# Patient Record
Sex: FEMALE | Race: WHITE | Employment: OTHER | URBAN - METROPOLITAN AREA
[De-identification: names, ages, dates, MRNs, and addresses within clinical notes are randomized per-mention and may not be internally consistent; named-entity substitution may affect disease eponyms.]

---

## 2018-04-20 ENCOUNTER — HOSPITAL ENCOUNTER (OUTPATIENT)
Age: 68
Setting detail: OBSERVATION
Discharge: HOME | End: 2018-04-21
Attending: EMERGENCY MEDICINE | Admitting: FAMILY MEDICINE
Payer: MEDICARE

## 2018-04-20 ENCOUNTER — APPOINTMENT (OUTPATIENT)
Dept: GENERAL RADIOLOGY | Age: 68
End: 2018-04-20
Attending: PHYSICIAN ASSISTANT
Payer: MEDICARE

## 2018-04-20 DIAGNOSIS — R00.0 SINUS TACHYCARDIA: ICD-10-CM

## 2018-04-20 DIAGNOSIS — I47.1 SVT (SUPRAVENTRICULAR TACHYCARDIA) (HCC): Primary | ICD-10-CM

## 2018-04-20 DIAGNOSIS — E03.9 HYPOTHYROIDISM, UNSPECIFIED TYPE: ICD-10-CM

## 2018-04-20 LAB
ALBUMIN SERPL-MCNC: 4 G/DL (ref 3.5–5)
ALBUMIN/GLOB SERPL: 1 {RATIO} (ref 1.1–2.2)
ALP SERPL-CCNC: 67 U/L (ref 45–117)
ALT SERPL-CCNC: 23 U/L (ref 12–78)
ANION GAP SERPL CALC-SCNC: 11 MMOL/L (ref 5–15)
AST SERPL-CCNC: 24 U/L (ref 15–37)
BASOPHILS # BLD: 0.1 K/UL (ref 0–0.1)
BASOPHILS NFR BLD: 1 % (ref 0–1)
BILIRUB SERPL-MCNC: 0.3 MG/DL (ref 0.2–1)
BUN SERPL-MCNC: 17 MG/DL (ref 6–20)
BUN/CREAT SERPL: 24 (ref 12–20)
CALCIUM SERPL-MCNC: 8.6 MG/DL (ref 8.5–10.1)
CHLORIDE SERPL-SCNC: 98 MMOL/L (ref 97–108)
CK SERPL-CCNC: 53 U/L (ref 26–192)
CK SERPL-CCNC: 74 U/L (ref 26–192)
CO2 SERPL-SCNC: 24 MMOL/L (ref 21–32)
CREAT SERPL-MCNC: 0.72 MG/DL (ref 0.55–1.02)
DIFFERENTIAL METHOD BLD: NORMAL
EOSINOPHIL # BLD: 0 K/UL (ref 0–0.4)
EOSINOPHIL NFR BLD: 0 % (ref 0–7)
ERYTHROCYTE [DISTWIDTH] IN BLOOD BY AUTOMATED COUNT: 13.1 % (ref 11.5–14.5)
GLOBULIN SER CALC-MCNC: 4.1 G/DL (ref 2–4)
GLUCOSE SERPL-MCNC: 151 MG/DL (ref 65–100)
HCT VFR BLD AUTO: 44.5 % (ref 35–47)
HGB BLD-MCNC: 14.8 G/DL (ref 11.5–16)
IMM GRANULOCYTES # BLD: 0 K/UL (ref 0–0.04)
IMM GRANULOCYTES NFR BLD AUTO: 0 % (ref 0–0.5)
LYMPHOCYTES # BLD: 1.7 K/UL (ref 0.8–3.5)
LYMPHOCYTES NFR BLD: 16 % (ref 12–49)
MAGNESIUM SERPL-MCNC: 1.9 MG/DL (ref 1.6–2.4)
MCH RBC QN AUTO: 31.1 PG (ref 26–34)
MCHC RBC AUTO-ENTMCNC: 33.3 G/DL (ref 30–36.5)
MCV RBC AUTO: 93.5 FL (ref 80–99)
MONOCYTES # BLD: 0.8 K/UL (ref 0–1)
MONOCYTES NFR BLD: 8 % (ref 5–13)
NEUTS SEG # BLD: 7.8 K/UL (ref 1.8–8)
NEUTS SEG NFR BLD: 75 % (ref 32–75)
NRBC # BLD: 0 K/UL (ref 0–0.01)
NRBC BLD-RTO: 0 PER 100 WBC
PLATELET # BLD AUTO: 244 K/UL (ref 150–400)
PMV BLD AUTO: 10.5 FL (ref 8.9–12.9)
POTASSIUM SERPL-SCNC: 4.2 MMOL/L (ref 3.5–5.1)
PROT SERPL-MCNC: 8.1 G/DL (ref 6.4–8.2)
RBC # BLD AUTO: 4.76 M/UL (ref 3.8–5.2)
SODIUM SERPL-SCNC: 133 MMOL/L (ref 136–145)
T4 FREE SERPL-MCNC: 1.8 NG/DL (ref 0.8–1.5)
TROPONIN I SERPL-MCNC: 0.06 NG/ML
TROPONIN I SERPL-MCNC: <0.04 NG/ML
TROPONIN I SERPL-MCNC: <0.04 NG/ML
TSH SERPL DL<=0.05 MIU/L-ACNC: 1.25 UIU/ML (ref 0.36–3.74)
WBC # BLD AUTO: 10.4 K/UL (ref 3.6–11)

## 2018-04-20 PROCEDURE — 74011250636 HC RX REV CODE- 250/636

## 2018-04-20 PROCEDURE — 74011250636 HC RX REV CODE- 250/636: Performed by: EMERGENCY MEDICINE

## 2018-04-20 PROCEDURE — 85025 COMPLETE CBC W/AUTO DIFF WBC: CPT | Performed by: PHYSICIAN ASSISTANT

## 2018-04-20 PROCEDURE — 74011250637 HC RX REV CODE- 250/637: Performed by: EMERGENCY MEDICINE

## 2018-04-20 PROCEDURE — 96361 HYDRATE IV INFUSION ADD-ON: CPT

## 2018-04-20 PROCEDURE — 96376 TX/PRO/DX INJ SAME DRUG ADON: CPT

## 2018-04-20 PROCEDURE — 84484 ASSAY OF TROPONIN QUANT: CPT | Performed by: PHYSICIAN ASSISTANT

## 2018-04-20 PROCEDURE — 84443 ASSAY THYROID STIM HORMONE: CPT | Performed by: EMERGENCY MEDICINE

## 2018-04-20 PROCEDURE — 96375 TX/PRO/DX INJ NEW DRUG ADDON: CPT

## 2018-04-20 PROCEDURE — 84480 ASSAY TRIIODOTHYRONINE (T3): CPT | Performed by: EMERGENCY MEDICINE

## 2018-04-20 PROCEDURE — 93005 ELECTROCARDIOGRAM TRACING: CPT

## 2018-04-20 PROCEDURE — 74011250636 HC RX REV CODE- 250/636: Performed by: FAMILY MEDICINE

## 2018-04-20 PROCEDURE — 96374 THER/PROPH/DIAG INJ IV PUSH: CPT

## 2018-04-20 PROCEDURE — 82550 ASSAY OF CK (CPK): CPT | Performed by: EMERGENCY MEDICINE

## 2018-04-20 PROCEDURE — 93306 TTE W/DOPPLER COMPLETE: CPT

## 2018-04-20 PROCEDURE — 71045 X-RAY EXAM CHEST 1 VIEW: CPT

## 2018-04-20 PROCEDURE — 74011000250 HC RX REV CODE- 250: Performed by: FAMILY MEDICINE

## 2018-04-20 PROCEDURE — 77030018729 HC ELECTRD DEFIB PAD CARD -B

## 2018-04-20 PROCEDURE — 96365 THER/PROPH/DIAG IV INF INIT: CPT

## 2018-04-20 PROCEDURE — 99285 EMERGENCY DEPT VISIT HI MDM: CPT

## 2018-04-20 PROCEDURE — 93041 RHYTHM ECG TRACING: CPT

## 2018-04-20 PROCEDURE — 74011000250 HC RX REV CODE- 250: Performed by: EMERGENCY MEDICINE

## 2018-04-20 PROCEDURE — 96366 THER/PROPH/DIAG IV INF ADDON: CPT

## 2018-04-20 PROCEDURE — 84439 ASSAY OF FREE THYROXINE: CPT | Performed by: EMERGENCY MEDICINE

## 2018-04-20 PROCEDURE — 94762 N-INVAS EAR/PLS OXIMTRY CONT: CPT

## 2018-04-20 PROCEDURE — 80053 COMPREHEN METABOLIC PANEL: CPT | Performed by: PHYSICIAN ASSISTANT

## 2018-04-20 PROCEDURE — 83735 ASSAY OF MAGNESIUM: CPT | Performed by: INTERNAL MEDICINE

## 2018-04-20 PROCEDURE — 74011000258 HC RX REV CODE- 258: Performed by: FAMILY MEDICINE

## 2018-04-20 PROCEDURE — 36415 COLL VENOUS BLD VENIPUNCTURE: CPT | Performed by: EMERGENCY MEDICINE

## 2018-04-20 PROCEDURE — 99218 HC RM OBSERVATION: CPT

## 2018-04-20 PROCEDURE — 86618 LYME DISEASE ANTIBODY: CPT | Performed by: EMERGENCY MEDICINE

## 2018-04-20 PROCEDURE — 65660000001 HC RM ICU INTERMED STEPDOWN

## 2018-04-20 PROCEDURE — 74011250637 HC RX REV CODE- 250/637: Performed by: INTERNAL MEDICINE

## 2018-04-20 RX ORDER — ADENOSINE 3 MG/ML
6 INJECTION, SOLUTION INTRAVENOUS
Status: COMPLETED | OUTPATIENT
Start: 2018-04-20 | End: 2018-04-20

## 2018-04-20 RX ORDER — METOPROLOL TARTRATE 5 MG/5ML
5 INJECTION INTRAVENOUS
Status: COMPLETED | OUTPATIENT
Start: 2018-04-20 | End: 2018-04-20

## 2018-04-20 RX ORDER — ADENOSINE 3 MG/ML
12 INJECTION, SOLUTION INTRAVENOUS
Status: COMPLETED | OUTPATIENT
Start: 2018-04-20 | End: 2018-04-20

## 2018-04-20 RX ORDER — SODIUM CHLORIDE 0.9 % (FLUSH) 0.9 %
5-10 SYRINGE (ML) INJECTION AS NEEDED
Status: DISCONTINUED | OUTPATIENT
Start: 2018-04-20 | End: 2018-04-21 | Stop reason: HOSPADM

## 2018-04-20 RX ORDER — ACETAMINOPHEN 325 MG/1
650 TABLET ORAL
Status: DISCONTINUED | OUTPATIENT
Start: 2018-04-20 | End: 2018-04-21 | Stop reason: HOSPADM

## 2018-04-20 RX ORDER — SODIUM CHLORIDE 0.9 % (FLUSH) 0.9 %
5-10 SYRINGE (ML) INJECTION EVERY 8 HOURS
Status: DISCONTINUED | OUTPATIENT
Start: 2018-04-20 | End: 2018-04-21 | Stop reason: HOSPADM

## 2018-04-20 RX ORDER — ADENOSINE 3 MG/ML
INJECTION, SOLUTION INTRAVENOUS
Status: COMPLETED
Start: 2018-04-20 | End: 2018-04-20

## 2018-04-20 RX ORDER — GUAIFENESIN 100 MG/5ML
81 LIQUID (ML) ORAL DAILY
Status: DISCONTINUED | OUTPATIENT
Start: 2018-04-21 | End: 2018-04-21 | Stop reason: HOSPADM

## 2018-04-20 RX ORDER — LIOTHYRONINE SODIUM 5 UG/1
5 TABLET ORAL DAILY
COMMUNITY
End: 2018-04-20

## 2018-04-20 RX ORDER — SODIUM CHLORIDE 9 MG/ML
75 INJECTION, SOLUTION INTRAVENOUS CONTINUOUS
Status: DISCONTINUED | OUTPATIENT
Start: 2018-04-20 | End: 2018-04-21 | Stop reason: HOSPADM

## 2018-04-20 RX ORDER — METOPROLOL TARTRATE 25 MG/1
25 TABLET, FILM COATED ORAL EVERY 8 HOURS
Status: DISCONTINUED | OUTPATIENT
Start: 2018-04-20 | End: 2018-04-21 | Stop reason: HOSPADM

## 2018-04-20 RX ORDER — LEVOTHYROXINE SODIUM 112 UG/1
112 TABLET ORAL
Status: DISCONTINUED | OUTPATIENT
Start: 2018-04-21 | End: 2018-04-21 | Stop reason: HOSPADM

## 2018-04-20 RX ORDER — METOPROLOL TARTRATE 25 MG/1
25 TABLET, FILM COATED ORAL 2 TIMES DAILY
Status: DISCONTINUED | OUTPATIENT
Start: 2018-04-20 | End: 2018-04-20

## 2018-04-20 RX ORDER — LEVOTHYROXINE SODIUM 112 UG/1
112 TABLET ORAL
COMMUNITY

## 2018-04-20 RX ADMIN — DILTIAZEM HYDROCHLORIDE 5 MG/HR: 5 INJECTION INTRAVENOUS at 16:54

## 2018-04-20 RX ADMIN — METOPROLOL TARTRATE 5 MG: 5 INJECTION, SOLUTION INTRAVENOUS at 14:05

## 2018-04-20 RX ADMIN — ADENOSINE 12 MG: 3 INJECTION, SOLUTION INTRAVENOUS at 14:02

## 2018-04-20 RX ADMIN — DILTIAZEM HYDROCHLORIDE 2.5 MG/HR: 5 INJECTION INTRAVENOUS at 18:31

## 2018-04-20 RX ADMIN — DILTIAZEM HYDROCHLORIDE 2.5 MG/HR: 5 INJECTION INTRAVENOUS at 16:15

## 2018-04-20 RX ADMIN — Medication 10 ML: at 21:45

## 2018-04-20 RX ADMIN — ADENOSINE 6 MG: 3 INJECTION, SOLUTION INTRAVENOUS at 13:32

## 2018-04-20 RX ADMIN — SODIUM CHLORIDE 1000 ML: 900 INJECTION, SOLUTION INTRAVENOUS at 13:22

## 2018-04-20 RX ADMIN — ADENOSINE 12 MG: 3 INJECTION, SOLUTION INTRAVENOUS at 13:36

## 2018-04-20 RX ADMIN — PROPRANOLOL HYDROCHLORIDE 60 MG: 20 TABLET ORAL at 17:33

## 2018-04-20 RX ADMIN — SODIUM CHLORIDE 1000 ML: 900 INJECTION, SOLUTION INTRAVENOUS at 15:53

## 2018-04-20 RX ADMIN — SODIUM CHLORIDE 75 ML/HR: 900 INJECTION, SOLUTION INTRAVENOUS at 16:57

## 2018-04-20 RX ADMIN — Medication 10 ML: at 15:11

## 2018-04-20 RX ADMIN — SODIUM CHLORIDE 1000 ML: 900 INJECTION, SOLUTION INTRAVENOUS at 14:08

## 2018-04-20 RX ADMIN — METOPROLOL TARTRATE 25 MG: 25 TABLET ORAL at 15:55

## 2018-04-20 RX ADMIN — METOPROLOL TARTRATE 5 MG: 5 INJECTION, SOLUTION INTRAVENOUS at 15:54

## 2018-04-20 NOTE — ED PROVIDER NOTES
HPI Comments: 69 y.o. female with past medical history significant for SVT, fibromyalgia, and hypothyroid who presents from PCP via private vehicle with chief complaint of heart palpitations. Pt states she has felt intermittent flutters and palpitations for a few days and overall not feeling well. Pt states she went to her PCP's office today and was found to have an elevated HR and BP, was given 2 pills \"that started with a C\" and was sent here for further evaluation, on arrival found to be in SVT. Pt reports a history of SVT, stating she had an ablation 15 years ago in [place]. Pt reports a history of hypothyroidism, and states 6 weeks ago her T3 was increased. Lab work from last week showed an increased Reverse T3 at 30.9, iodine at 103, 3T4 of 1.93. Pt states she recently tested positive for Lyme's Disease, found a tick on her 3 days ago, has not started treatment yet. Pt denies any chest pain, nausea, vomiting, diarrhea, fever, cough, or appetite change. There are no other acute medical concerns at this time. Social hx: Nonsmoker; No EtOH use    Note written by Tali Hughes, as dictated by Yin Perry, DO 1:20 PM          The history is provided by the patient. No  was used. Past Medical History:   Diagnosis Date    Fibromyalgia     Hypothyroid     SVT (supraventricular tachycardia) (HCC)        Past Surgical History:   Procedure Laterality Date    HX OTHER SURGICAL      removal of IUD    HX SVT ABLATION      HX TONSILLECTOMY           History reviewed. No pertinent family history. Social History     Social History    Marital status: N/A     Spouse name: N/A    Number of children: N/A    Years of education: N/A     Occupational History    Not on file.      Social History Main Topics    Smoking status: Never Smoker    Smokeless tobacco: Never Used    Alcohol use No    Drug use: Not on file    Sexual activity: Not on file     Other Topics Concern    Not on file     Social History Narrative    No narrative on file         ALLERGIES: Codeine and Sulfa (sulfonamide antibiotics)    Review of Systems   Constitutional: Negative for appetite change and fever. HENT: Negative for trouble swallowing. Eyes: Negative for visual disturbance. Respiratory: Negative for cough. Cardiovascular: Positive for palpitations. Negative for chest pain. Gastrointestinal: Negative for diarrhea, nausea and vomiting. Genitourinary: Negative for difficulty urinating. Musculoskeletal: Negative for gait problem. Skin: Negative for rash. Neurological: Negative for headaches. Hematological: Does not bruise/bleed easily. Psychiatric/Behavioral: Negative for sleep disturbance. All other systems reviewed and are negative. Vitals:    04/20/18 1252   BP: 173/82   Pulse: (!) 168   Resp: 18   Temp: 98.1 °F (36.7 °C)   SpO2: 96%   Weight: 61.3 kg (135 lb 2 oz)   Height: 5' 5\" (1.651 m)            Physical Exam   Constitutional: She is oriented to person, place, and time. She appears well-developed and well-nourished. HENT:   Head: Normocephalic and atraumatic. Eyes: Conjunctivae are normal.   Neck: Normal range of motion. Cardiovascular: Intact distal pulses. Tachycardia present. Tachycardic. Pulmonary/Chest: Effort normal and breath sounds normal. No respiratory distress. Abdominal: Soft. Bowel sounds are increased. There is no tenderness. There is no rebound and no guarding. Hyperactive bowel sounds. Musculoskeletal: Normal range of motion. She exhibits edema. Trace pretibial edema. Neurological: She is alert and oriented to person, place, and time. Skin: Skin is warm and dry. Psychiatric: Her behavior is normal.   Nursing note and vitals reviewed.      Note written by Tali Mathews, as dictated by Adwoa Chun DO 1:20 PM    MDM  Number of Diagnoses or Management Options  Hypothyroidism, unspecified type:   Sinus tachycardia:   SVT (supraventricular tachycardia) ([facility] [address]):      Amount and/or Complexity of Data Reviewed  Clinical lab tests: ordered and reviewed  Tests in the radiology section of CPT®: ordered and reviewed  Tests in the medicine section of CPT®: ordered and reviewed  Obtain history from someone other than the patient: yes  Review and summarize past medical records: yes  Discuss the patient with other providers: yes  Independent visualization of images, tracings, or specimens: yes    Risk of Complications, Morbidity, and/or Mortality  Presenting problems: high  Diagnostic procedures: low  Management options: high    Critical Care  Total time providing critical care:  minutes (Total critical care time spent exclusive of procedures:  75 minutes.   )    Patient Progress  Patient progress: improved        ED Course       Procedures    ED EKG interpretation: 13:00 #1  Rhythm: Supraventricular tachycardia with singular PVC vs fusion complex; and regular . Rate (approx.): 165 bpm; Axis: normal; Long QTc at 487 ms; Not a STEMI. Note written by Tali Gallagher, as dictated by Lilia Smith DO 1:06 PM    PROGRESS NOTE:  1:21 PM  Performed Modified Valsalva, no success. PROGRESS NOTE:  1:27 PM  Retried modified valsalva, no success. PROGRESS NOTE:  1:32 PM  6 mg Adenocard given, no success. PROGRESS NOTE:  1:34 PM  12 mg Adenocard given, success conversation to a normal sinus rhythm. ED EKG interpretation: 13:35 #2  Rhythm: sinus tachycardia; and regular . Rate (approx.): 122 bpm; Axis: normal; Normal intervals; No ST changes. Note written by Tali Gallagher, as dictated by Lilia Smith DO 1:45 PM      PROGRESS NOTE:  1:54 PM  Pt's HR is now 160. Ordering another 12 mg of adenocard. PROGRESS NOTE:  2:02 PM  12 mg Adenocard given. Success for 60 seconds. PROGRESS NOTE:  2:05 PM  5 mg of IV metoprolol given. HR down to 110s.      PROGRESS NOTE:  2:13 PM  HR now creeping up to high 120s, 130.     CONSULT NOTE:  2:15 PM Jaspreet Goodman DO spoke with Dr. Delvis Alexander, Consult for Hospitalist.  Discussed available diagnostic tests and clinical findings. Dr. Delvis Alexander will see and admit the pt. PROGRESS NOTE:  2:24 PM  HR is now low 130s. More metoprolol IV ordered, as well as propranolol PO    PROGRESS NOTE:  2:36 PM  Cardiology PA is in ED to see the pt.

## 2018-04-20 NOTE — ED TRIAGE NOTES
Pt was sent here by her MD for heart palpitations, pt states that she has them on and off a lot. Pt denies chest pain, SOB, and NVD. Pt states that her MD gave her a medication that \"started with a C\" in her office.

## 2018-04-20 NOTE — PROGRESS NOTES
Admission Medication Reconciliation:    Information obtained from: Patient, Rx Query, and [employer]    Significant PMH/Disease States:   Past Medical History:   Diagnosis Date    Fibromyalgia     Hypothyroid     SVT (supraventricular tachycardia) ([facility] [address]        Chief Complaint for this Admission:  No chief complaint on file. Allergies:  Codeine; Erythromycin; and Sulfa (sulfonamide antibiotics)    Prior to Admission Medications:   Prior to Admission Medications   Prescriptions Last Dose Informant Patient Reported? Taking? OTHER   Yes Yes   Sig: Variety of supplements - names unknown. OTHER,NON-FORMULARY, 4/20/2018 at Unknown time  Yes Yes   Sig: Take 5 mcg by mouth daily. Compounded SR T3 (replacement for Cytomel) from [employer]   levothyroxine (LEVOXYL) 112 mcg tablet 4/20/2018 at Unknown time  Yes Yes   Sig: Take 112 mcg by mouth Daily (before breakfast). Facility-Administered Medications: None         Comments/Recommendations:     Spoke with patient regarding allergies and PTA medications. [address] for clarification on compounded T3.    1) Reviewed and confirmed allergies. Added erythromycin - patient experiences overall \"weird feeling\" and nausea. 2) Updated PTA medication list. Added levoxyl, compounded T3, and \"other\" listing for variety of supplements. The patient reports she takes a variety of OTC supplements, however she is unsure the names of them at this time. Rx Query also shows fills for ivermectin 3 mg filled 4/19/18 for #16 and 40 ds, minocycline 100 mg filled 4/10/18 for #30 and 15 ds, and tinadazole 250 mg filled 4/19/18 for #30 and 60 ds. Patient reports she has not started taking any of these medications. Last dose information listed in table above.       Tabatha Roberts, MikeD

## 2018-04-20 NOTE — IP AVS SNAPSHOT
[address] [facility] [address] 
[phone] Patient: Saige Werner MRN: [id number] :1950 A check maria dolores indicates which time of day the medication should be taken. My Medications START taking these medications Instructions Each Dose to Equal  
 Morning Noon Evening Bedtime  
 dilTIAZem  mg ER capsule Commonly known as:  CARDIZEM CD Your last dose was: Your next dose is: Take 1 Cap by mouth daily. 120 mg  
    
   
   
   
  
 magnesium oxide 400 mg tablet Commonly known as:  MAG-OX Start taking on:  2018 Your last dose was: Your next dose is: Take 1 Tab by mouth daily. 400 mg  
    
   
   
   
  
 nebivolol 5 mg tablet Commonly known as:  BYSTOLIC Your last dose was: Your next dose is: Take 1 Tab by mouth daily. 5 mg CONTINUE taking these medications Instructions Each Dose to Equal  
 Morning Noon Evening Bedtime LEVOXYL 112 mcg tablet Generic drug:  levothyroxine Your last dose was: Your next dose is: Take 112 mcg by mouth Daily (before breakfast). 112 mcg STOP taking these medications OTHER  
   
  
 OTHER(NON-FORMULARY) Where to Get Your Medications Information on where to get these meds will be given to you by the nurse or doctor. ! Ask your nurse or doctor about these medications  
  dilTIAZem  mg ER capsule  
 magnesium oxide 400 mg tablet  
 nebivolol 5 mg tablet

## 2018-04-20 NOTE — IP AVS SNAPSHOT
[address] [facility] [address] [place] [address] 
[phone] Patient: Quang Rowan MRN: [id number] :1950 About your hospitalization You were admitted on:  2018 You last received care in the:  [facility] [address] You were discharged on:  2018 Why you were hospitalized Your primary diagnosis was:  Svt (Supraventricular Tachycardia) ([facility]) Follow-up Information Follow up With Details Comments Contact Info Ariela Cedeño MD Schedule an appointment as soon as possible for a visit in 1 week  [facility] [place] [address] [facility] 
[phone] Gt Le MD In 1 week  [place] [address] [address] [place] [address] 
[phone] Discharge Orders None A check maria doolres indicates which time of day the medication should be taken. My Medications START taking these medications Instructions Each Dose to Equal  
 Morning Noon Evening Bedtime  
 dilTIAZem  mg ER capsule Commonly known as:  CARDIZEM CD Your last dose was: Your next dose is: Take 1 Cap by mouth daily. 120 mg  
    
   
   
   
  
 magnesium oxide 400 mg tablet Commonly known as:  MAG-OX Start taking on:  2018 Your last dose was: Your next dose is: Take 1 Tab by mouth daily. 400 mg  
    
   
   
   
  
 nebivolol 5 mg tablet Commonly known as:  BYSTOLIC Your last dose was: Your next dose is: Take 1 Tab by mouth daily. 5 mg CONTINUE taking these medications Instructions Each Dose to Equal  
 Morning Noon Evening Bedtime LEVOXYL 112 mcg tablet Generic drug:  levothyroxine Your last dose was: Your next dose is: Take 112 mcg by mouth Daily (before breakfast). 112 mcg STOP taking these medications  OTHER  
 OTHER(NON-FORMULARY) Where to Get Your Medications Information on where to get these meds will be given to you by the nurse or doctor. ! Ask your nurse or doctor about these medications  
  dilTIAZem  mg ER capsule  
 magnesium oxide 400 mg tablet  
 nebivolol 5 mg tablet Discharge Instructions Discharge Instructions PATIENT ID: Kacey Kay MRN: [id number] YOB: 1950 DATE OF ADMISSION: 4/20/2018  1:02 PM   
DATE OF DISCHARGE: 4/21/2018 PRIMARY CARE PROVIDER: Kennedy Schaffer MD  
 
ATTENDING PHYSICIAN: Marie Recinos MD 
DISCHARGING PROVIDER: Aimee Sherman MD   
To contact this individual call [phone] and ask the  to page. If unavailable ask to be transferred the Adult Hospitalist Department. DISCHARGE DIAGNOSES 1. SVT 
 
CONSULTATIONS: IP CONSULT TO CARDIOLOGY PROCEDURES/SURGERIES: * No surgery found * PENDING TEST RESULTS:  
At the time of discharge the following test results are still pending: None FOLLOW UP APPOINTMENTS:  
Follow-up Information Follow up With Details Comments Contact Info Kennedy Schaffer MD Schedule an appointment as soon as possible for a visit in 1 week  [facility] [address] [place] [address] 
[phone] ADDITIONAL CARE RECOMMENDATIONS:  
 
1. Follow up with PCP 2. Needs TSH, T4 at your visit. DIET: Cardiac diet ACTIVITY: Activity as tolerated DISCHARGE MEDICATIONS: 
 See Medication Reconciliation Form · It is important that you take the medication exactly as they are prescribed. · Keep your medication in the bottles provided by the pharmacist and keep a list of the medication names, dosages, and times to be taken in your wallet. · Do not take other medications without consulting your doctor. NOTIFY YOUR PHYSICIAN FOR ANY OF THE FOLLOWING:  
Fever over 101 degrees for 24 hours. Chest pain, shortness of breath, fever, chills, nausea, vomiting, diarrhea, change in mentation, falling, weakness, bleeding. Severe pain or pain not relieved by medications. Or, any other signs or symptoms that you may have questions about. DISPOSITION: 
x  Home With: 
 OT  PT  [place]  RN  
  
 SNF/Inpatient Rehab/LTAC Independent/assisted living Hospice Other: CDMP Checked:  
Yes x PROBLEM LIST Updated: 
Yes x Signed:  
Toni Noyola MD 
4/21/2018 
1:22 PM 
 
  
  
  
Introducing [facility] SERVICES! [employer] introduces [employer] patient portal. Now you can access parts of your medical record, email your doctor's office, and request medication refills online. 1. In your internet browser, go to [email or web address] 2. Click on the First Time User? Click Here link in the Sign In box. You will see the New Member Sign Up page. 3. Enter your [employer] Access Code exactly as it appears below. You will not need to use this code after youve completed the sign-up process. If you do not sign up before the expiration date, you must request a new code. · [employer] Access Code: [email or web address] Expires: 7/19/2018 12:48 PM 
 
4. Enter the last four digits of your Social Security Number (xxxx) and Date of Birth (mm/dd/yyyy) as indicated and click Submit. You will be taken to the next sign-up page. 5. Create a [employer] ID. This will be your [employer] login ID and cannot be changed, so think of one that is secure and easy to remember. 6. Create a [employer] password. You can change your password at any time. 7. Enter your Password Reset Question and Answer. This can be used at a later time if you forget your password. 8. Enter your e-mail address. You will receive e-mail notification when new information is available in [address] 9. Click Sign Up. You can now view and download portions of your medical record.  
10. Click the Download Summary menu link to download a portable copy of your medical information. If you have questions, please visit the Frequently Asked Questions section of the [employer] website. Remember, [employer] is NOT to be used for urgent needs. For medical emergencies, dial 911. Now available from your iPhone and Android! Introducing Vinicio Joyner As a [facility] patient, I wanted to make you aware of our electronic visit tool called Vinicio Joyner. [facility] 24/7 allows you to connect within minutes with a medical provider 24 hours a day, seven days a week via a mobile device or tablet or logging into a secure website from your computer. You can access Vinicio Joyner from anywhere in the [place]. A virtual visit might be right for you when you have a simple condition and feel like you just dont want to get out of bed, or cant get away from work for an appointment, when your regular [facility] provider is not available (evenings, weekends or holidays), or when youre out of town and need minor care. Electronic visits cost only $49 and if the [facility] 24/7 provider determines a prescription is needed to treat your condition, one can be electronically transmitted to a nearby pharmacy*. Please take a moment to enroll today if you have not already done so. The enrollment process is free and takes just a few minutes. To enroll, please download the [facility] [employer] tahis to your tablet or phone, or visit [email or web address] to enroll on your computer. And, as an [address] [place] [address] patient with a [employer] account, the results of your visits will be scanned into your electronic medical record and your primary care provider will be able to view the scanned results. We urge you to continue to see your regular [facility] provider for your ongoing medical care.   And while your primary care provider may not be the one available when you seek a Vinicio Joyner virtual visit, the peace of mind you get from getting a real diagnosis real time can be priceless. For more information on Vinicio Juvenalmaren, view our Frequently Asked Questions (FAQs) at [email or web address]. Sincerely, 
 
Amie Laura MD 
Chief Medical Officer [address] *:  certain medications cannot be prescribed via Vinicio Joyner Unresulted Labs-Please follow up with your PCP about these lab tests Order Current Status LYME AB TOTAL W/RFLX W BLOT In process T3 TOTAL In process Providers Seen During Your Hospitalization Provider Specialty Primary office phone Cristine Montalvo, [address] [facility] Emergency Medicine [phone] Flavia Maharaj MD Hospitalist [phone] Your Primary Care Physician (PCP) Primary Care Physician Office Phone Office Fax 34269 Edmund Zamora, [address] [facility] [phone] You are allergic to the following Allergen Reactions Codeine Other (comments) Hallucinations Erythromycin Nausea Only Other (comments) Patient describes overall \"weird feeling\" when taking medication Sulfa (Sulfonamide Antibiotics) Unknown (comments) Recent Documentation Height Weight BMI OB Status Smoking Status 1.651 m 61.3 kg 22.49 kg/m2 Postmenopausal Never Smoker Emergency Contacts Name Discharge Info Relation Home Work Mobile Champ Bhagat DISCHARGE CAREGIVER [3] Spouse [3] [phone] Aria Mills DISCHARGE CAREGIVER [3] Daughter [phone] Patient Belongings The following personal items are in your possession at time of discharge: 
  Dental Appliances: None  Visual Aid: None      Home Medications: None   Jewelry: None  Clothing: None    Other Valuables: Cell Phone Please provide this summary of care documentation to your next provider.  
  
  
 
  
Signatures-by signing, you are acknowledging that this After Visit Summary has been reviewed with you and you have received a copy. Patient Signature:  ____________________________________________________________ Date:  ____________________________________________________________  
  
Romero Livings Provider Signature:  ____________________________________________________________ Date:  ____________________________________________________________

## 2018-04-20 NOTE — H&P
[address]   HISTORY AND PHYSICAL      He Arellano  MR#: [id number]  : 1950  ACCOUNT #: [de-identified]   ADMIT DATE: 2018    CHIEF COMPLAINT:  Palpitations. HISTORY OF PRESENT ILLNESS:  Patient is a 78-year-old female with past medical history of supraventricular tachycardia, fibromyalgia, hypothyroidism and recently diagnosed  who presents to the hospital.  Patient reports that about three days back, she started having some intermittent flutters and palpitations and having generalized weakness. The patient had a scheduled appointment with the PCP today, felt nervous while going to the PCP and also had some fluttering in her chest.  Patient was found to have elevated heart rate and elevated blood pressure was given 2 unknown pills by the PCP and was sent to the ER for further management and evaluation. Patient reports that she has a history of SVT and was diagnosed with SVT and was ablated for SVT. about 15 years ago in [place]. The patient reports that she has a history of hypothyroidism. About 6 weeks ago her T3 was increased and there was some increased reverse T3 that was noticed. The patient's medications were titrated based on the same. The patient also reports that about two weeks back she was diagnosed with Lyme disease. The patient reports that per her PCP she was told that the Lyme disease has been going on for at least a year or so. The patient reports that she has developed some tremors, but is not sure whether this is because of the Lyme disease. The patient denies any rashes or any joint pains associated with her symptoms. The patient reports that she found a live tick on her finger about 3 days ago. Patient reports that she was prescribed treatment for Lyme, but she has not started the treatment as of now. Patient denies any other complaints or problems, denies any headache, blurry vision, sore throat,  problems with speech, any chest pain. Does admit to mild shortness of breath. Denies any fever, chills, hematemesis. No melena, hemoptysis, nausea, vomiting, abdominal pain, constipation, diarrhea, urinary symptoms, focal or generalized neurological weakness, recent travel, sick contacts, or any other concerns or problems. The patient was found to have a heart rate of around 168 on arrival to the ER and a blood pressure 163/98. Currently, the patient's heart rate is in the 130s. Patient denies any other complaints or problems. PAST MEDICAL HISTORY:  See above. HOME MEDICATIONS:  Currently the patient is on levothyroxine 112 mcg daily, compounded T3 5 mcg daily. SOCIAL HISTORY:  Denies tobacco use, alcohol use, IV drug abuse. Lives at home. ALLERGIES:  CODEINE, ERYTHROMYCIN, SULFA ANTIBIOTICS. FAMILY HISTORY:  Was discussed, was found to be noncontributory. REVIEW OF SYSTEMS:  All systems were reviewed and were found to be essentially negative except for those symptoms mentioned above. PHYSICAL EXAMINATION:  VITAL SIGNS:  Temperature 98.1, pulse 137, respiratory rate 18, blood pressure 148/72, pulse oximetry 96% on room air. GENERAL:  Alert x3 awake, no acute distress, resting in bed, pleasant female, appears to be stated age. HEENT:  Pupils equal, reactive to light. Dry mucous membranes. Tympanic membranes clear. NECK:  Supple. CHEST:  Clear to auscultation bilaterally. HEART:  Tachycardic. ABDOMEN:  Soft, nontender, nondistended. Bowel sounds are physiological.  EXTREMITIES:  No clubbing, no cyanosis, no edema. NEUROPSYCHIATRIC:  Pleasant mood and affect. Cranial nerves II-XII grossly intact. Sensory grossly within normal limits. DTRs 2+/4. Strength 5/5. SKIN:  Warm. LABORATORY DATA:  White count 10.4, hemoglobin 14.8, hematocrit 44.5, platelets 613,574.   Sodium 133, potassium 4.2, chloride 98, bicarbonate 24, anion gap 11, glucose 151, BUN 17, creatinine 0.72, calcium 8.6,  , bilirubin total 0.3, ALT 23, AST 24, alkaline phosphatase 67. Troponin less than 0.04. TSH 1.65. IMAGING: X-ray of the chest shows no acute abnormalities. EKG shows sinus tachycardia with PVCs. ASSESSMENT AND PLAN:    1. Sustained Narrow complex tachycardia, appears to be supraventricular tachycardia. I spoke with Dr. Robert Go from cardiology who recommended starting the patient on diltiazem continuous infusion. The patient already received 30 of adenosine in the ER along with some metoprolol. We will also add oral beta blockers to control her heart rate. We will get an echocardiogram, troponin, TSH, T3, and T4 levels. We will provide gentle IV hydration and close monitoring. May consider cardioversion. Defer to cardiology. Further intervention will be per hospital course. Reassess as needed. Continue to closely monitor on telemetry bed. We will also get an echocardiogram.  2.  Recent diagnosis of Lyme disease. I spoke with Dr. Abi Martines from ID. He recommends getting Lyme antibody testing done, holding antibiotics for now and monitoring. She will consult if and when Lyme test results or if patient becomes symptomatic. We will continue to monitor IV fluids. Further intervention will be per hospital course. We will reassess as needed.    have been ordered. 3.  History of hypothyroidism. Continue home medication, T3, T4 have been ordered. We will obtain records from primary care physician to decide her recent changes in medication. 4.  History of cystitis. The patient reports that she has been having some dysuria. We will check a UA and monitor. 5. Gastrointestinal and deep vein thrombosis prophylaxis. The patient will be on sequential compression devices.       Lyndesy Cooper MD MM/PREMA  D: 04/20/2018 15:11     T: 04/20/2018 16:07  JOB #: [id number]

## 2018-04-20 NOTE — ED NOTES
Pt . Dr. Gilma Carias at bedside. Adeoncard 12mg ordered and administered. IVF infusing. Pt A&Ox4. O2 99% on RA. Pt HR remains 160's.  New verbal order received

## 2018-04-20 NOTE — CONSULTS
Cardiology Consult    Patient: Serenity Church MRN: [id number]  SSN: [id number]    YOB: 1950  Age: 69 y.o. Sex: female       Subjective:      Date of  Admission: 4/20/2018     Admission type: Emergency    Serenity Church is a 69 y.o. female admitted for SVT (supraventricular tachycardia) ([facility] Reason: SVT  Referring: Dr. Nova Curiel  Pt with h/o SVT s/p ablation in [place] 15 yrs ago, fibromyalgia, hypothyroid admit with SVT. She went to see her PCP today to check her thyroid and was 'very nervous'. Found there to be in SVT and sent to the ER. She felt palpitations similar to SVT in the past.  Prior to ablation, she had been on bystolic and verapamil. She came off those meds many years ago. She had palpitations infrequently since then. Denies any shortness of breath or exertional chest pain. Has baseline chest pain from fibromyalgia. Was given adenosine x 1 and went to sinus rhythm, later repeated for recurrent SVT. HR is in 130s in sinus vs atrial tachycardia. Primary Care Provider: Ruben Dixon MD  Past Medical History:   Diagnosis Date    Fibromyalgia     Hypothyroid     SVT (supraventricular tachycardia) ([facility]       Past Surgical History:   Procedure Laterality Date    HX OTHER SURGICAL      removal of IUD    HX SVT ABLATION      HX TONSILLECTOMY       History reviewed. No pertinent family history. Social History   Substance Use Topics    Smoking status: Never Smoker    Smokeless tobacco: Never Used    Alcohol use No      Current Facility-Administered Medications   Medication Dose Route Frequency    sodium chloride 0.9 % bolus infusion 1,000 mL  1,000 mL IntraVENous ONCE    propranolol (INDERAL) tablet 60 mg  60 mg Oral ONCE    metoprolol (LOPRESSOR) injection 5 mg  5 mg IntraVENous NOW    [START ON 4/21/2018] levothyroxine (SYNTHROID) tablet 112 mcg  112 mcg Oral ACB    . PHARMACY TO SUBSTITUTE PER PROTOCOL    Per Protocol    sodium chloride (NS) flush 5-10 mL  5-10 mL IntraVENous Q8H    sodium chloride (NS) flush 5-10 mL  5-10 mL IntraVENous PRN    0.9% sodium chloride infusion  75 mL/hr IntraVENous CONTINUOUS    dilTIAZem (CARDIZEM) 125 mg in dextrose 5% 125 mL infusion  0-15 mg/hr IntraVENous TITRATE    [START ON 4/21/2018] aspirin chewable tablet 81 mg  81 mg Oral DAILY    acetaminophen (TYLENOL) tablet 650 mg  650 mg Oral Q4H PRN    cefTRIAXone (ROCEPHIN) 2 g in 0.9% sodium chloride (MBP/ADV) 50 mL  2 g IntraVENous Q24H    metoprolol tartrate (LOPRESSOR) tablet 25 mg  25 mg Oral Q8H     Current Outpatient Prescriptions   Medication Sig    levothyroxine (LEVOXYL) 112 mcg tablet Take 112 mcg by mouth Daily (before breakfast).  OTHER,NON-FORMULARY, Take 5 mcg by mouth daily. Compounded SR T3 (replacement for Cytomel) from [place] [address] of supplements - names unknown. Allergies   Allergen Reactions    Codeine Other (comments)     Hallucinations     Erythromycin Nausea Only and Other (comments)     Patient describes overall \"weird feeling\" when taking medication    Sulfa (Sulfonamide Antibiotics) Unknown (comments)        Review of Systems:  A comprehensive review of systems was negative except for that written in the History of Present Illness. Subjective:     Visit Vitals    BP (!) 163/98    Pulse (!) 158    Temp 98.1 °F (36.7 °C)    Resp 18    Ht 5' 5\" (1.651 m)    Wt 135 lb 2 oz (61.3 kg)    SpO2 96%    BMI 22.49 kg/m2        Physical Exam:  Visit Vitals    BP (!) 163/98    Pulse (!) 158    Temp 98.1 °F (36.7 °C)    Resp 18    Ht 5' 5\" (1.651 m)    Wt 135 lb 2 oz (61.3 kg)    SpO2 96%    BMI 22.49 kg/m2     General Appearance:  Well developed, well nourished,alert and oriented x 3, and individual in no acute distress. Ears/Nose/Mouth/Throat:   Hearing grossly normal.         Neck: Supple. No JVD   Chest:   Lungs clear to auscultation bilaterally.    Cardiovascular:  Regular tachy, S1, S2 normal, no murmur. Abdomen:   Soft, non-tender, bowel sounds are active. Extremities: No edema bilaterally. Skin: Warm and dry. Cardiographics:  Telemetry: sinus tachycardia, intermittent SVT  ECG: nonspecific ST and T waves changes, sinus tachycardia. Paroxysmal SVT  Echocardiogram: pending     Data Reviewed: All lab results for the last 24 hours reviewed. Assessment:         Hospital Problems  Date Reviewed: 4/20/2018          Codes Class Noted POA    * (Principal)[facility] ICD-10-CM: I47.1  ICD-9-CM: 427.89  4/20/2018 Unknown               Plan:     Paroxysmal SVT. Could be partly triggers by thyroid but TSH is ok. Dilt gtt overnight and po metoprolol. Echo pending. Electrolytes overall ok. Will add Mg. Talked about options going forward including ablation depending on response to meds. Dr. Jeromy Cantu covering over weekend.     Signed By: Melinda Farmer MD     April 20, 2018

## 2018-04-20 NOTE — ED NOTES
Dr. Darrell Walton at bedside. Pacer pads placed on pt. Adenocard 6mg and 12mg administered. Underlying rhythm sinus tach. IVF infusing. Pt A&Ox4. O2 99% on RA.

## 2018-04-20 NOTE — ED NOTES

## 2018-04-21 VITALS
DIASTOLIC BLOOD PRESSURE: 86 MMHG | HEIGHT: 65 IN | TEMPERATURE: 98.1 F | OXYGEN SATURATION: 95 % | SYSTOLIC BLOOD PRESSURE: 152 MMHG | RESPIRATION RATE: 14 BRPM | WEIGHT: 135.13 LBS | HEART RATE: 68 BPM | BODY MASS INDEX: 22.51 KG/M2

## 2018-04-21 LAB
ANION GAP SERPL CALC-SCNC: 7 MMOL/L (ref 5–15)
ATRIAL RATE: 122 BPM
ATRIAL RATE: 125 BPM
ATRIAL RATE: 163 BPM
BASOPHILS # BLD: 0.1 K/UL (ref 0–0.1)
BASOPHILS NFR BLD: 1 % (ref 0–1)
BUN SERPL-MCNC: 10 MG/DL (ref 6–20)
BUN/CREAT SERPL: 19 (ref 12–20)
CALCIUM SERPL-MCNC: 7.8 MG/DL (ref 8.5–10.1)
CALCULATED P AXIS, ECG09: 49 DEGREES
CALCULATED P AXIS, ECG09: 82 DEGREES
CALCULATED R AXIS, ECG10: -16 DEGREES
CALCULATED R AXIS, ECG10: -17 DEGREES
CALCULATED R AXIS, ECG10: -5 DEGREES
CALCULATED T AXIS, ECG11: 178 DEGREES
CALCULATED T AXIS, ECG11: 50 DEGREES
CALCULATED T AXIS, ECG11: 67 DEGREES
CHLORIDE SERPL-SCNC: 107 MMOL/L (ref 97–108)
CK SERPL-CCNC: 64 U/L (ref 26–192)
CO2 SERPL-SCNC: 26 MMOL/L (ref 21–32)
CREAT SERPL-MCNC: 0.54 MG/DL (ref 0.55–1.02)
DIAGNOSIS, 93000: NORMAL
DIFFERENTIAL METHOD BLD: NORMAL
EOSINOPHIL # BLD: 0.1 K/UL (ref 0–0.4)
EOSINOPHIL NFR BLD: 2 % (ref 0–7)
ERYTHROCYTE [DISTWIDTH] IN BLOOD BY AUTOMATED COUNT: 13.2 % (ref 11.5–14.5)
GLUCOSE SERPL-MCNC: 94 MG/DL (ref 65–100)
HCT VFR BLD AUTO: 38.6 % (ref 35–47)
HGB BLD-MCNC: 12.8 G/DL (ref 11.5–16)
IMM GRANULOCYTES # BLD: 0 K/UL (ref 0–0.04)
IMM GRANULOCYTES NFR BLD AUTO: 0 % (ref 0–0.5)
LYMPHOCYTES # BLD: 3.4 K/UL (ref 0.8–3.5)
LYMPHOCYTES NFR BLD: 43 % (ref 12–49)
MAGNESIUM SERPL-MCNC: 1.8 MG/DL (ref 1.6–2.4)
MCH RBC QN AUTO: 30.8 PG (ref 26–34)
MCHC RBC AUTO-ENTMCNC: 33.2 G/DL (ref 30–36.5)
MCV RBC AUTO: 93 FL (ref 80–99)
MONOCYTES # BLD: 0.8 K/UL (ref 0–1)
MONOCYTES NFR BLD: 10 % (ref 5–13)
NEUTS SEG # BLD: 3.4 K/UL (ref 1.8–8)
NEUTS SEG NFR BLD: 44 % (ref 32–75)
NRBC # BLD: 0 K/UL (ref 0–0.01)
NRBC BLD-RTO: 0 PER 100 WBC
P-R INTERVAL, ECG05: 132 MS
P-R INTERVAL, ECG05: 138 MS
PLATELET # BLD AUTO: 212 K/UL (ref 150–400)
PMV BLD AUTO: 10.9 FL (ref 8.9–12.9)
POTASSIUM SERPL-SCNC: 3.3 MMOL/L (ref 3.5–5.1)
Q-T INTERVAL, ECG07: 294 MS
Q-T INTERVAL, ECG07: 312 MS
Q-T INTERVAL, ECG07: 326 MS
QRS DURATION, ECG06: 90 MS
QRS DURATION, ECG06: 92 MS
QRS DURATION, ECG06: 96 MS
QTC CALCULATION (BEZET), ECG08: 444 MS
QTC CALCULATION (BEZET), ECG08: 481 MS
QTC CALCULATION (BEZET), ECG08: 487 MS
RBC # BLD AUTO: 4.15 M/UL (ref 3.8–5.2)
SODIUM SERPL-SCNC: 140 MMOL/L (ref 136–145)
TROPONIN I SERPL-MCNC: 0.05 NG/ML
VENTRICULAR RATE, ECG03: 122 BPM
VENTRICULAR RATE, ECG03: 131 BPM
VENTRICULAR RATE, ECG03: 165 BPM
WBC # BLD AUTO: 7.8 K/UL (ref 3.6–11)

## 2018-04-21 PROCEDURE — 85025 COMPLETE CBC W/AUTO DIFF WBC: CPT | Performed by: FAMILY MEDICINE

## 2018-04-21 PROCEDURE — 83735 ASSAY OF MAGNESIUM: CPT | Performed by: HOSPITALIST

## 2018-04-21 PROCEDURE — 84484 ASSAY OF TROPONIN QUANT: CPT | Performed by: FAMILY MEDICINE

## 2018-04-21 PROCEDURE — 82550 ASSAY OF CK (CPK): CPT | Performed by: EMERGENCY MEDICINE

## 2018-04-21 PROCEDURE — 36415 COLL VENOUS BLD VENIPUNCTURE: CPT | Performed by: FAMILY MEDICINE

## 2018-04-21 PROCEDURE — 80048 BASIC METABOLIC PNL TOTAL CA: CPT | Performed by: FAMILY MEDICINE

## 2018-04-21 PROCEDURE — 74011250637 HC RX REV CODE- 250/637: Performed by: INTERNAL MEDICINE

## 2018-04-21 PROCEDURE — 74011250637 HC RX REV CODE- 250/637: Performed by: FAMILY MEDICINE

## 2018-04-21 PROCEDURE — 96361 HYDRATE IV INFUSION ADD-ON: CPT

## 2018-04-21 PROCEDURE — 93005 ELECTROCARDIOGRAM TRACING: CPT

## 2018-04-21 PROCEDURE — 99218 HC RM OBSERVATION: CPT

## 2018-04-21 PROCEDURE — 74011250637 HC RX REV CODE- 250/637: Performed by: HOSPITALIST

## 2018-04-21 PROCEDURE — 74011250636 HC RX REV CODE- 250/636: Performed by: FAMILY MEDICINE

## 2018-04-21 RX ORDER — NEBIVOLOL 5 MG/1
5 TABLET ORAL DAILY
Qty: 90 TAB | Refills: 3 | Status: SHIPPED | OUTPATIENT
Start: 2018-04-21 | End: 2018-08-14 | Stop reason: SDUPTHER

## 2018-04-21 RX ORDER — LANOLIN ALCOHOL/MO/W.PET/CERES
400 CREAM (GRAM) TOPICAL DAILY
Qty: 30 TAB | Refills: 0 | Status: SHIPPED | OUTPATIENT
Start: 2018-04-22

## 2018-04-21 RX ORDER — LANOLIN ALCOHOL/MO/W.PET/CERES
400 CREAM (GRAM) TOPICAL DAILY
Status: DISCONTINUED | OUTPATIENT
Start: 2018-04-22 | End: 2018-04-21 | Stop reason: HOSPADM

## 2018-04-21 RX ORDER — DILTIAZEM HYDROCHLORIDE 120 MG/1
120 CAPSULE, COATED, EXTENDED RELEASE ORAL DAILY
Qty: 90 CAP | Refills: 3 | Status: SHIPPED | OUTPATIENT
Start: 2018-04-21

## 2018-04-21 RX ORDER — POTASSIUM CHLORIDE 750 MG/1
40 TABLET, FILM COATED, EXTENDED RELEASE ORAL
Status: COMPLETED | OUTPATIENT
Start: 2018-04-21 | End: 2018-04-21

## 2018-04-21 RX ORDER — POTASSIUM CHLORIDE 750 MG/1
20 TABLET, FILM COATED, EXTENDED RELEASE ORAL DAILY
Status: DISCONTINUED | OUTPATIENT
Start: 2018-04-22 | End: 2018-04-21 | Stop reason: HOSPADM

## 2018-04-21 RX ADMIN — LEVOTHYROXINE SODIUM 112 MCG: 112 TABLET ORAL at 06:33

## 2018-04-21 RX ADMIN — METOPROLOL TARTRATE 25 MG: 25 TABLET ORAL at 05:24

## 2018-04-21 RX ADMIN — Medication 10 ML: at 05:24

## 2018-04-21 RX ADMIN — POTASSIUM CHLORIDE 40 MEQ: 750 TABLET, FILM COATED, EXTENDED RELEASE ORAL at 08:30

## 2018-04-21 RX ADMIN — ASPIRIN 81 MG 81 MG: 81 TABLET ORAL at 08:30

## 2018-04-21 RX ADMIN — SODIUM CHLORIDE 75 ML/HR: 900 INJECTION, SOLUTION INTRAVENOUS at 08:30

## 2018-04-21 NOTE — DISCHARGE SUMMARY
Discharge Summary       PATIENT ID: Sofia Vieira  MRN: [id number]   YOB: 1950    DATE OF ADMISSION: 4/20/2018  1:02 PM    DATE OF DISCHARGE: 4/21/18  PRIMARY CARE PROVIDER: Erik Díaz MD     ATTENDING PHYSICIAN: Piero Painter MD  DISCHARGING PROVIDER: Piero Painter MD    To contact this individual call [phone] and ask the  to page. If unavailable ask to be transferred the Adult Hospitalist Department. CONSULTATIONS: IP CONSULT TO CARDIOLOGY    PROCEDURES/SURGERIES: * No surgery found *    ADMITTING DIAGNOSES & HOSPITAL COURSE:   SVT    Patient is a 78-year-old female with past medical history of supraventricular tachycardia, fibromyalgia, hypothyroidism and recently diagnosed Lyme who presents to the hospital.  Patient reports that about three days back, she started having some intermittent flutters and palpitations and having generalized weakness. The patient had a scheduled appointment with the PCP today, felt nervous while going to the PCP and also had some fluttering in her chest.  Patient was found to have elevated heart rate and elevated blood pressure was given 2 unknown pills by the PCP and was sent to the ER for further management and evaluation. Patient reports that she has a history of SVT and was diagnosed with SVT and was ablated for SVT. The patient reports that she has a history of hypothyroidism. About 6 weeks ago her T3 was increased and there was some increased reverse T3 that was noticed. The patient's medications were titrated based on the same. The patient also reports that about two weeks back she was diagnosed with Lyme disease. The patient reports that per her PCP she was told that the Lyme disease has been going on for at least a year or so. The patient reports that she has developed some tremors, but is not sure whether this is because of the Lyme disease.   The patient denies any rashes or any joint pains associated with her symptoms. The patient reports that she found a live tick on her finger about 3 days ago. Patient reports that she was prescribed treatment for Lyme, but she has not started the treatment as of now. Patient denies any other complaints or problems, denies any headache, blurry vision, sore throat, denies problems with speech, any chest pain. Does admit to mild shortness of breath. Denies any fever, chills, hematemesis. No melena, hemoptysis, nausea, vomiting, abdominal pain, constipation, diarrhea, urinary symptoms, focal or generalized neurological weakness, recent travel, sick contacts, or any other concerns or problems. The patient was found to have a heart rate of around 168 on arrival to the ER and a blood pressure 163/98. Currently, the patient's heart rate is in the 130s. Patient denies any other complaints or problems. Please see cardiology consult note for details. Paroxysmal SVT: Received Diltiazem drip for some time with improvement in HR. This could likely related to her T3. As per cards discharged on Diltiazem and Bystolic. Which she in the past for SVT.         DISCHARGE DIAGNOSES / PLAN:      1. See above       PENDING TEST RESULTS:   At the time of discharge the following test results are still pending: Lyme antibody. FOLLOW UP APPOINTMENTS:    Follow-up Information     Follow up With Details Comments Dejuan Dodson MD Schedule an appointment as soon as possible for a visit in 1 week  [facility] [address]      Jordan Coronel MD In 1 week  [address]  [phone]             ADDITIONAL CARE RECOMMENDATIONS:   1. Follow up with PCP  2. Pending Lyme antibodies, PCP has to follow up on this.     DIET: Cardiac Diet    ACTIVITY: Activity as tolerated      DISCHARGE MEDICATIONS:  Discharge Medication List as of 4/21/2018  1:56 PM      START taking these medications Details   nebivolol (BYSTOLIC) 5 mg tablet Take 1 Tab by mouth daily. , Print, Disp-90 Tab, R-3      dilTIAZem CD (CARDIZEM CD) 120 mg ER capsule Take 1 Cap by mouth daily. , Print, Disp-90 Cap, R-3      magnesium oxide (MAG-OX) 400 mg tablet Take 1 Tab by mouth daily. , Print, Disp-30 Tab, R-0         CONTINUE these medications which have NOT CHANGED    Details   levothyroxine (LEVOXYL) 112 mcg tablet Take 112 mcg by mouth Daily (before breakfast). , Historical Med         STOP taking these medications       OTHER,NON-FORMULARY, Comments:   Reason for Stopping:         OTHER Comments:   Reason for Stopping:                 NOTIFY YOUR PHYSICIAN FOR ANY OF THE FOLLOWING:   Fever over 101 degrees for 24 hours. Chest pain, shortness of breath, fever, chills, nausea, vomiting, diarrhea, change in mentation, falling, weakness, bleeding. Severe pain or pain not relieved by medications. Or, any other signs or symptoms that you may have questions about. DISPOSITION:  x  Home With:   OT  PT  HH  RN       Long term SNF/Inpatient Rehab    Independent/assisted living    Hospice    Other:       PATIENT CONDITION AT DISCHARGE:  Stable    Functional status    Poor     Deconditioned    x Independent      Cognition   x  Lucid     Forgetful     Dementia      Catheters/lines (plus indication)    P[place]     PICC     PEG    x None      Code status    x Full code     DNR      PHYSICAL EXAMINATION AT DISCHARGE:  Gen: NAD  CVS: S1, S2 WNL, RRR  RS: CTABL  GI: BS +, soft, NT, ND  CNS: AOx3, no focal motor deficits.       CHRONIC MEDICAL DIAGNOSES:  Problem List as of 4/21/2018  Date Reviewed: 4/20/2018          Codes Class Noted - Resolved    * (Principal)[facility]ricular [facility]) [facility] ICD-10-CM: I47.1  ICD-9-CM: 427.89  4/20/2018 - Present              Greater than 35 minutes were spent with the patient on counseling and coordination of care    Signed:   Gioavna Early MD  4/21/2018  7:45 PM

## 2018-04-21 NOTE — PROGRESS NOTES
Bedside shift change report given to Abril (oncoming nurse) by Benjamin Yañez (offgoing nurse). Report included the following information SBAR, Kardex, Intake/Output, Recent Results and Cardiac Rhythm.

## 2018-04-21 NOTE — PROGRESS NOTES
2015- Assumed care of patient. MD Bourne notified of recent troponin. No new orders at this time. Patient resting in bed,  at bedside. Patient denies any pain. 2205- Patient noted to be in NSR, HR 58-62. MD notified and orders received to hold metoprolol and stop IV cardizem for 1 hour. 2330- Patient resting in bed. Denies any pain. Bedside shift change report given to Alicia Higginbotham (oncoming nurse) by Cesar Otero (offgoing nurse). Report included the following information SBAR, Kardex, ED Summary, Intake/Output, MAR, Recent Results and Cardiac Rhythm NSR with PVCs.

## 2018-04-21 NOTE — DISCHARGE INSTRUCTIONS
Discharge Instructions       PATIENT ID: Eduardo Adames  MRN: [id number]   YOB: 1950    DATE OF ADMISSION: 4/20/2018  1:02 PM    DATE OF DISCHARGE: 4/21/2018    PRIMARY CARE PROVIDER: John Conway MD     ATTENDING PHYSICIAN: Savannah Ewing MD  DISCHARGING PROVIDER: Aracely Gramajo MD    To contact this individual call [phone] and ask the  to page. If unavailable ask to be transferred the Adult Hospitalist Department. DISCHARGE DIAGNOSES     1. SVT    CONSULTATIONS: IP CONSULT TO CARDIOLOGY    PROCEDURES/SURGERIES: * No surgery found *    PENDING TEST RESULTS:   At the time of discharge the following test results are still pending: None    FOLLOW UP APPOINTMENTS:   Follow-up Information     Follow up With Details Comments Dejuan Dodson MD Schedule an appointment as soon as possible for a visit in 1 week  [facility]   [phone]             ADDITIONAL CARE RECOMMENDATIONS:     1. Follow up with PCP  2. Needs TSH, T4 at your visit. DIET: Cardiac diet      ACTIVITY: Activity as tolerated      DISCHARGE MEDICATIONS:   See Medication Reconciliation Form    · It is important that you take the medication exactly as they are prescribed. · Keep your medication in the bottles provided by the pharmacist and keep a list of the medication names, dosages, and times to be taken in your wallet. · Do not take other medications without consulting your doctor. NOTIFY YOUR PHYSICIAN FOR ANY OF THE FOLLOWING:   Fever over 101 degrees for 24 hours. Chest pain, shortness of breath, fever, chills, nausea, vomiting, diarrhea, change in mentation, falling, weakness, bleeding. Severe pain or pain not relieved by medications. Or, any other signs or symptoms that you may have questions about.       DISPOSITION:  x  Home With:   OT  PT  HH  RN       SNF/Inpatient Rehab/LTAC    Independent/assisted living Hospice    Other:     CDMP Checked:   Yes x     PROBLEM LIST Updated:  Yes x       Signed:   Karma Roe MD  4/21/2018  1:22 PM

## 2018-04-21 NOTE — PROGRESS NOTES
Problem: Afib Pathway: Day 1  Goal: Activity/Safety  Outcome: Progressing Towards Goal  Up with min assist  Goal: Nutrition/Diet  Outcome: Resolved/Met Date Met: 04/20/18  Reg cardiac diet  Goal: Respiratory  Outcome: Resolved/Met Date Met: 04/20/18  RA, CTA  Goal: Psychosocial  Outcome: Resolved/Met Date Met: 04/20/18  Family at bedside  Goal: *Optimal pain control at patient's stated goal  Outcome: Resolved/Met Date Met: 04/20/18  Denies any pain  Goal: *Stable cardiac rhythm  Outcome: Progressing Towards Goal  Afib, rate controlled

## 2018-04-21 NOTE — PROGRESS NOTES
Problem: Afib Pathway: Day 1  Goal: Discharge Planning  Outcome: Progressing Towards Goal  Dc home today

## 2018-04-21 NOTE — ED NOTES
2330: Assumed care of pt. Report received from [place], [address] [place] [address]. Pt in NSR with PVCs  0100: Pt up to [facility], NAD  0230: Pt us to [facility], [facility], morning labs drawn  0320: Patient left department on cardiac monitor for transportation to inpatient bed. Patient's VS at the time of transfer were /77, 99 on RA, denies pain at this time, 98.4 orally, HR 59 sinus angie rhythm on the monitor. Patient was alert and oriented x 4 and denies further needs at time of transfer. Patient's family went home prior to transfer to floor. TRANSFER - OUT REPORT:    Verbal report given to KIRILL Barr(name) on Eduardo Stephensic  being transferred to [facility](unit) for routine progression of care       Report consisted of patients Situation, Background, Assessment and   Recommendations(SBAR). Information from the following report(s) SBAR, Kardex, ED Summary, [place] and Recent Results was reviewed with the receiving nurse. Opportunity for questions and clarification was provided.

## 2018-04-21 NOTE — PROGRESS NOTES
Cardiology Consult    Patient: Eduardo Adames MRN: [id number]  SSN: [id number]    YOB: 1950  Age: 69 y.o. Sex: female       Subjective:      Date of  Admission: 4/20/2018     Admission type: Emergency    Eduardo Adames is a 69 y.o. female admitted for SVT (supraventricular tachycardia) ([facility] Reason: SVT  Referring: Dr. Corin Mukherjee  Pt with h/o SVT s/p ablation in [place] 15 yrs ago, fibromyalgia, hypothyroid admit with SVT. She went to see her PCP today to check her thyroid and was 'very nervous'. Found there to be in SVT and sent to the ER. She felt palpitations similar to SVT in the past.  Prior to ablation, she had been on bystolic and verapamil. She came off those meds many years ago. She had palpitations infrequently since then. Denies any shortness of breath or exertional chest pain. Has baseline chest pain from fibromyalgia. Was given adenosine x 1 and went to sinus rhythm, later repeated for recurrent SVT. HR is in 130s in sinus vs atrial tachycardia. This am feeling well but tired. NSR, tolerating meds, no dizziness, dyspnea, edema, no palps. Primary Care Provider: John Conway MD  Past Medical History:   Diagnosis Date    Fibromyalgia     Hypothyroid     SVT (supraventricular tachycardia) ([facility]       Past Surgical History:   Procedure Laterality Date    HX OTHER SURGICAL      removal of IUD    HX SVT ABLATION      HX TONSILLECTOMY       History reviewed. No pertinent family history. Social History   Substance Use Topics    Smoking status: Never Smoker    Smokeless tobacco: Never Used    Alcohol use No      Current Facility-Administered Medications   Medication Dose Route Frequency    levothyroxine (SYNTHROID) tablet 112 mcg  112 mcg Oral ACB    . PHARMACY TO SUBSTITUTE PER PROTOCOL    Per Protocol    sodium chloride (NS) flush 5-10 mL  5-10 mL IntraVENous Q8H    sodium chloride (NS) flush 5-10 mL  5-10 mL IntraVENous PRN    0.9% sodium chloride infusion  75 mL/hr IntraVENous CONTINUOUS    dilTIAZem (CARDIZEM) 125 mg in dextrose 5% 125 mL infusion  0-15 mg/hr IntraVENous TITRATE    aspirin chewable tablet 81 mg  81 mg Oral DAILY    acetaminophen (TYLENOL) tablet 650 mg  650 mg Oral Q4H PRN    cefTRIAXone (ROCEPHIN) 2 g in 0.9% sodium chloride (MBP/ADV) 50 mL  2 g IntraVENous Q24H    metoprolol tartrate (LOPRESSOR) tablet 25 mg  25 mg Oral Q8H        Allergies   Allergen Reactions    Codeine Other (comments)     Hallucinations     Erythromycin Nausea Only and Other (comments)     Patient describes overall \"weird feeling\" when taking medication    Sulfa (Sulfonamide Antibiotics) Unknown (comments)        Review of Systems:  A comprehensive review of systems was negative except for that written in the History of Present Illness. Subjective:     Visit Vitals    BP (!) 163/92 (BP 1 Location: Right arm, BP Patient Position: At rest)    Pulse 68    Temp 97.9 °F (36.6 °C)    Resp 15    Ht 5' 5\" (1.651 m)    Wt 135 lb 2 oz (61.3 kg)    SpO2 98%    BMI 22.49 kg/m2        Physical Exam:  Visit Vitals    BP (!) 163/92 (BP 1 Location: Right arm, BP Patient Position: At rest)    Pulse 68    Temp 97.9 °F (36.6 °C)    Resp 15    Ht 5' 5\" (1.651 m)    Wt 135 lb 2 oz (61.3 kg)    SpO2 98%    BMI 22.49 kg/m2     General Appearance:  Well developed, well nourished,alert and oriented x 3, and individual in no acute distress. Ears/Nose/Mouth/Throat:   Hearing grossly normal.         Neck: Supple. No JVD   Chest:   Lungs clear to auscultation bilaterally. Cardiovascular:  Regular tachy, S1, S2 normal, no murmur. Abdomen:   Soft, non-tender, bowel sounds are active. Extremities: No edema bilaterally. Skin: Warm and dry. Cardiographics:  Telemetry: sinus tachycardia, intermittent SVT  ECG: nonspecific ST and T waves changes, sinus tachycardia. Paroxysmal SVT  Echocardiogram: pending     Data Reviewed:  All lab results for the last 24 hours reviewed. Assessment:         Hospital Problems  Date Reviewed: 4/20/2018          Codes Class Noted POA    * (Principal)SVT (supraventricular tachycardia) ([facility] ICD-10-CM: I47.1  ICD-9-CM: 427.89  4/20/2018 Unknown               Plan:     Paroxysmal SVT. Could be partly triggers by thyroid but TSH is ok. Dilt gtt overnight and po metoprolol. Echo pending. Electrolytes overall ok. Will add Mg. Talked about options going forward including ablation depending on response to meds. Today change meds to bystolic 5mg daily as prior toprol intolerance. Felt better and did better on bystolic. Requests printed rx. Dilt CD 120mg daily   Mag ox 400mg daily. OOK to go home today. NSR today looks good, just tired.        Signed By: Abundio Bennett MD     April 21, 2018

## 2018-04-22 LAB — T3 SERPL-MCNC: 81 NG/DL (ref 71–180)

## 2018-04-23 LAB — B BURGDOR IGG+IGM SER-ACNC: <0.91 ISR (ref 0–0.9)

## 2018-05-02 ENCOUNTER — OFFICE VISIT (OUTPATIENT)
Dept: CARDIOLOGY CLINIC | Age: 68
End: 2018-05-02

## 2018-05-02 VITALS
HEART RATE: 56 BPM | BODY MASS INDEX: 22.46 KG/M2 | WEIGHT: 134.8 LBS | DIASTOLIC BLOOD PRESSURE: 90 MMHG | SYSTOLIC BLOOD PRESSURE: 140 MMHG | RESPIRATION RATE: 16 BRPM | HEIGHT: 65 IN

## 2018-05-02 DIAGNOSIS — I47.1 PAROXYSMAL SVT (SUPRAVENTRICULAR TACHYCARDIA) (HCC): Primary | ICD-10-CM

## 2018-05-02 DIAGNOSIS — R00.2 HEART PALPITATIONS: ICD-10-CM

## 2018-05-02 RX ORDER — DOXYCYCLINE 100 MG/1
1 TABLET ORAL 2 TIMES DAILY
COMMUNITY
Start: 2018-04-25

## 2018-05-02 NOTE — PROGRESS NOTES
[place]: Geni Sanderson  [phone]    History of Present Illness:    Ms. Josee Garcia was hospitalized recently for SVT. She had her medications adjusted and was placed on Bystolic and Diltiazem. Since her hospital discharge, she has been doing okay. She has rare palpitations. While she has been tolerating the medications okay, she overall feels more tired than usual.  She does not know if this is due to adjustment in thyroid medication. She also had an echocardiogram that noted EF of 45%. She is compensated on exam with clear lungs. Blood pressure is 140/90 with a heart rate of 56 beats per minute. Assessment and Plan:   1. Paroxysmal supraventricular tachycardia. She had a history of SVT in the past and ablation and now this has recurred. Overall, this seems controlled okay on Bystolic and Diltiazem. However, she does have baseline fatigue and she is not sure how much of this is thyroid, how much of this may be related to the medication. Will have her follow back in six to eight weeks and reassess her symptoms. At that time, if she is still feeling restricted, would consider consultation with our EP, Dr. Ange Boucher for ablation. She saw both myself and Dr. Isabel Jimenez in the hospital and asked if she could see Dr. Isabel Jimenez long term; will help arrange f/u.  2. Cardiomyopathy. Could have been tachycardia induced. Would consider repeat echocardiogram in three to six months. If this is still low, would consider additional ischemic evaluation and we talked a little bit about this. 3. Fibromyalgia. 4. Hypothyroid. She  has a past medical history of Fibromyalgia; Hypothyroid; and SVT (supraventricular tachycardia) ([address] All other systems negative except as above. PE  Vitals:    05/02/18 1255   BP: 140/90   Pulse: (!) 56   Resp: 16   Weight: 134 lb 12.8 oz (61.1 kg)   Height: 5' 5\" (1.651 m)    Body mass index is 22.43 kg/(m^2).    General appearance - alert, well appearing, and in no distress  Mental status - affect appropriate to mood  Eyes - sclera anicteric, moist mucous membranes  Neck - supple, no JVD  Chest - clear to auscultation, no wheezes, rales or rhonchi  Heart - normal rate, regular rhythm, normal S1, S2, no murmurs, rubs, clicks or gallops  Abdomen - soft, nontender, nondistended, no masses or organomegaly  Back exam - full range of motion, no tenderness  Neurological - no focal deficit  Extremities - peripheral pulses normal, no pedal edema      Recent Labs:  No results found for: CHOL, CHOLX, CHLST, CHOLV, 650226, HDL, LDL, LDLC, DLDLP, TGLX, TRIGL, TRIGP, CHHD, CHHDX  Lab Results   Component Value Date/Time    Creatinine 0.54 (L) 04/21/2018 02:47 AM     Lab Results   Component Value Date/Time    BUN 10 04/21/2018 02:47 AM     Lab Results   Component Value Date/Time    Potassium 3.3 (L) 04/21/2018 02:47 AM     No results found for: HBA1C, HGBE8, WNL2KSCU  Lab Results   Component Value Date/Time    HGB 12.8 04/21/2018 02:47 AM     Lab Results   Component Value Date/Time    PLATELET 942 89/23/3268 02:47 AM       Reviewed:  Past Medical History:   Diagnosis Date    Fibromyalgia     Hypothyroid     SVT (supraventricular tachycardia) (HCC)      History   Smoking Status    Never Smoker   Smokeless Tobacco    Never Used     History   Alcohol Use No     Allergies   Allergen Reactions    Codeine Other (comments)     Hallucinations     Erythromycin Nausea Only and Other (comments)     Patient describes overall \"weird feeling\" when taking medication    Sulfa (Sulfonamide Antibiotics) Unknown (comments)       Current Outpatient Prescriptions   Medication Sig    Thyroid, Pork, (NATURE-THROID) 65 mg tab Take 1 Tab by mouth daily.  doxycycline (ADOXA) 100 mg tablet Take 1 Tab by mouth two (2) times a day.  nebivolol (BYSTOLIC) 5 mg tablet Take 1 Tab by mouth daily.  dilTIAZem CD (CARDIZEM CD) 120 mg ER capsule Take 1 Cap by mouth daily.     magnesium oxide (MAG-OX) 400 mg tablet Take 1 Tab by mouth daily.  levothyroxine (LEVOXYL) 112 mcg tablet Take 112 mcg by mouth Daily (before breakfast). No current facility-administered medications for this visit.         Ramesh Orr MD  [facility] heart and [facility]ascular [place]  [address] [facility]  [address]

## 2018-05-02 NOTE — LETTER
5/2/2018 11:32 PM 
 
Patient:  Dusty Casas YOB: 1950 Date of Visit: 5/2/2018 Dear MD [address] [address] VIA Facsimile: [phone] 
 : 
 
Ms. Loc Bean was hospitalized recently for SVT. She had her medications adjusted and was placed on Bystolic and Diltiazem. Since her hospital discharge, she has been doing okay. She has rare palpitations. While she has been tolerating the medications okay, she overall feels more tired than usual.  She does not know if this is due to adjustment in thyroid medication. She also had an echocardiogram that noted EF of 45%. She is compensated on exam with clear lungs. Blood pressure is 140/90 with a heart rate of 56 beats per minute. Assessment and Plan: 1. Paroxysmal supraventricular tachycardia. She had a history of SVT in the past and ablation and now this has recurred. Overall, this seems controlled okay on Bystolic and Diltiazem. However, she does have baseline fatigue and she is not sure how much of this is thyroid, how much of this may be related to the medication. Will have her follow back in six to eight weeks and reassess her symptoms. At that time, if she is still feeling restricted, would consider consultation with our EP, Dr. Amelia Appiah for ablation. She saw both myself and Dr. Lino Quick in the hospital and asked if she could see Dr. Lino Quick long term; will help arrange f/u. 
2. Cardiomyopathy. Could have been tachycardia induced. Would consider repeat echocardiogram in three to six months. If this is still low, would consider additional ischemic evaluation and we talked a little bit about this. 3. Fibromyalgia. 4. Hypothyroid. If you have questions, please do not hesitate to call me. Sincerely, Jimmie Garcia MD

## 2018-05-02 NOTE — MR AVS SNAPSHOT
[address] [facility] [address] [place] 
[phone] Patient: Dyan Doll MRN: [id number] :1950 Visit Information Date & Time Provider Department Dept. Phone Encounter #  
 2018  1:00 PM Darian Treadwell MD [facility] [phone] [id number] Your Appointments 2018  8:20 AM  
ESTABLISHED PATIENT with Jani Tomas MD  
[facility] ([address] [facility]) Appt Note: end of  appt per Dr. Erna Hudson w/Dr. Yana Reese [address] [place]  [address] [place] [address]  
[address] [address] Upcoming Health Maintenance Date Due Hepatitis C Screening 1950 DTaP/Tdap/Td series (1 - Tdap) 10/10/1969 BREAST CANCER SCRN MAMMOGRAM 10/10/1998 FOBT Q 1 YEAR AGE 50-75 10/10/1998 ZOSTER VACCINE AGE 60> 8/10/2008 GLAUCOMA SCREENING Q2Y 10/10/2013 Bone Densitometry (Dexa) Screening 10/10/2013 Pneumococcal 65+ Low/Medium Risk (1 of 2 - PCV13) 10/10/2013 MEDICARE YEARLY EXAM 2018 Influenza Age 5 to Adult 2018 Allergies as of 2018  Review Complete On: 2018 By: Cameron Alejo Severity Noted Reaction Type Reactions Codeine  2018    Other (comments) Hallucinations Erythromycin  2018    Nausea Only, Other (comments) Patient describes overall \"weird feeling\" when taking medication Sulfa (Sulfonamide Antibiotics)  2018    Unknown (comments) Current Immunizations  Never Reviewed No immunizations on file. Not reviewed this visit Vitals BP Pulse Resp Height(growth percentile) Weight(growth percentile) BMI  
 140/90 (BP 1 Location: Left arm, BP Patient Position: Sitting) (!) 56 16 5' 5\" (1.651 m) 134 lb 12.8 oz (61.1 kg) 22.43 kg/m2 OB Status Smoking Status Postmenopausal Never Smoker Vitals History BMI and BSA Data Body Mass Index Body Surface Area  
 22.43 kg/m 2 1.67 m 2 Your Updated Medication List  
  
   
This list is accurate as of 5/2/18  1:31 PM.  Always use your most recent med list.  
  
  
  
  
 dilTIAZem  mg ER capsule Commonly known as:  CARDIZEM CD Take 1 Cap by mouth daily. doxycycline 100 mg tablet Commonly known as:  ADOXA Take 1 Tab by mouth two (2) times a day. LEVOXYL 112 mcg tablet Generic drug:  levothyroxine Take 112 mcg by mouth Daily (before breakfast). magnesium oxide 400 mg tablet Commonly known as:  MAG-OX Take 1 Tab by mouth daily. NATURE-THROID 65 mg Tab Generic drug:  Thyroid (Pork) Take 1 Tab by mouth daily. nebivolol 5 mg tablet Commonly known as:  BYSTOLIC Take 1 Tab by mouth daily. Introducing [facility]! Dear Arleth Wellington: Thank you for requesting a [employer] account. Our records indicate that you already have an active [employer] account. You can access your account anytime at [email or web address] Did you know that you can access your hospital and ER discharge instructions at any time in [employer]? You can also review all of your test results from your hospital stay or ER visit. Additional Information If you have questions, please visit the Frequently Asked Questions section of the [employer] website at [email or web address] Remember, [employer] is NOT to be used for urgent needs. For medical emergencies, dial 911. Now available from your iPhone and Android! Please provide this summary of care documentation to your next provider. Your primary care clinician is listed as Rigo Cleveland. If you have any questions after today's visit, please call [phone].

## 2018-06-26 ENCOUNTER — OFFICE VISIT (OUTPATIENT)
Dept: CARDIOLOGY CLINIC | Age: 68
End: 2018-06-26

## 2018-06-26 VITALS
HEART RATE: 66 BPM | WEIGHT: 136.8 LBS | RESPIRATION RATE: 16 BRPM | DIASTOLIC BLOOD PRESSURE: 90 MMHG | BODY MASS INDEX: 22.79 KG/M2 | SYSTOLIC BLOOD PRESSURE: 130 MMHG | HEIGHT: 65 IN

## 2018-06-26 DIAGNOSIS — R53.83 FATIGUE, UNSPECIFIED TYPE: ICD-10-CM

## 2018-06-26 DIAGNOSIS — I42.9 CARDIOMYOPATHY, UNSPECIFIED TYPE (HCC): ICD-10-CM

## 2018-06-26 DIAGNOSIS — I47.1 SVT (SUPRAVENTRICULAR TACHYCARDIA) (HCC): Primary | ICD-10-CM

## 2018-06-26 RX ORDER — LEVOTHYROXINE SODIUM 137 UG/1
TABLET ORAL
COMMUNITY

## 2018-06-26 NOTE — MR AVS SNAPSHOT
[address] [facility] [address] [place] 
[phone] Patient: Dyan Doll MRN: [id number] :1950 Visit Information Date & Time Provider Department Dept. Phone Encounter #  
 2018  8:20 AM Jani Tomas MD [facility] [phone] [id number] Your Appointments 2018 11:00 AM  
ECHO CARDIOGRAMS 2D with Bernardino Gann [facility] ([facility] SCHEDULING) Appt Note: echo for SVT, cardiomyopathy Dr. Art Nolan 12:15 kmr [address] [place]  [address] [facility] [address]  
[address] [facility]  
  
    
 2018 12:15 PM  
ESTABLISHED PATIENT with Jani Tomas MD  
[facility] ([address] [facility]) Appt Note: echo for SVT, cardiomyopathy 11:00 Dr. Art Nolan 12:15 kmr [address] [place] [address]  
[address] [facility],[address] [address] Upcoming Health Maintenance Date Due Hepatitis C Screening 1950 DTaP/Tdap/Td series (1 - Tdap) 10/10/1969 BREAST CANCER SCRN MAMMOGRAM 10/10/1998 FOBT Q 1 YEAR AGE 50-75 10/10/1998 ZOSTER VACCINE AGE 60> 8/10/2008 GLAUCOMA SCREENING Q2Y 10/10/2013 Bone Densitometry (Dexa) Screening 10/10/2013 Pneumococcal 65+ Low/Medium Risk (1 of 2 - PCV13) 10/10/2013 MEDICARE YEARLY EXAM 2018 Influenza Age 5 to Adult 2018 Allergies as of 2018  Review Complete On: 2018 By: Cameron Alejo Severity Noted Reaction Type Reactions Codeine  2018    Other (comments) Hallucinations Erythromycin  2018    Nausea Only, Other (comments) Patient describes overall \"weird feeling\" when taking medication Sulfa (Sulfonamide Antibiotics)  2018    Unknown (comments) Current Immunizations  Never Reviewed No immunizations on file. Not reviewed this visit You Were Diagnosed With   
  
 Codes Comments SVT (supraventricular tachycardia) ([facility]    -  Primary ICD-10-CM: I47.1 ICD-9-CM: 427.89 Cardiomyopathy, unspecified type ([facility]     ICD-10-CM: I42.9 ICD-9-CM: 425.4 Fatigue, unspecified type     ICD-10-CM: R53.83 ICD-9-CM: 780.79 Vitals BP Pulse Resp Height(growth percentile) Weight(growth percentile) BMI  
 130/90 (BP 1 Location: Right arm, BP Patient Position: Sitting) 66 16 5' 5\" (1.651 m) 136 lb 12.8 oz (62.1 kg) 22.76 kg/m2 OB Status Smoking Status Postmenopausal Never Smoker Vitals History BMI and BSA Data Body Mass Index Body Surface Area  
 22.76 kg/m 2 1.69 m 2 Your Updated Medication List  
  
   
This list is accurate as of 6/26/18  9:04 AM.  Always use your most recent med list.  
  
  
  
  
 dilTIAZem  mg ER capsule Commonly known as:  CARDIZEM CD Take 1 Cap by mouth daily. doxycycline 100 mg tablet Commonly known as:  ADOXA Take 1 Tab by mouth two (2) times a day. * LEVOXYL 112 mcg tablet Generic drug:  levothyroxine Take 112 mcg by mouth Daily (before breakfast). * levothyroxine 137 mcg tablet Commonly known as:  SYNTHROID Take  by mouth Daily (before breakfast). magnesium oxide 400 mg tablet Commonly known as:  MAG-OX Take 1 Tab by mouth daily. NATURE-THROID 65 mg Tab Generic drug:  Thyroid (Pork) Take 1 Tab by mouth daily. nebivolol 5 mg tablet Commonly known as:  BYSTOLIC Take 1 Tab by mouth daily. * Notice: This list has 2 medication(s) that are the same as other medications prescribed for you. Read the directions carefully, and ask your doctor or other care provider to review them with you. Patient Instructions Stop your diltiazem today(cardizem) restart if SVT We can consider changing you Bystolic 5mg daily to toprolXL 25mg at bedtime if you prefer for cost reasons, just let me know. Also we will recheck your echo for heart function in 6 weeks. Patient Instructions History Introducing [facility]! Dear Jadyn North: Thank you for requesting a [employer] account. Our records indicate that you already have an active [employer] account. You can access your account anytime at [email or web address] Did you know that you can access your hospital and ER discharge instructions at any time in [employer]? You can also review all of your test results from your hospital stay or ER visit. Additional Information If you have questions, please visit the Frequently Asked Questions section of the [employer] website at [email or web address] Remember, [employer] is NOT to be used for urgent needs. For medical emergencies, dial 911. Now available from your iPhone and Android! Please provide this summary of care documentation to your next provider. Your primary care clinician is listed as Darlene Riley. If you have any questions after today's visit, please call [phone].

## 2018-06-26 NOTE — PATIENT INSTRUCTIONS
Stop your diltiazem today(cardizem) restart if SVT    We can consider changing you Bystolic 5mg daily to toprolXL 25mg at bedtime if you prefer for cost reasons, just let me know. Also we will recheck your echo for heart function in 6 weeks.

## 2018-06-26 NOTE — PROGRESS NOTES
[place]: Eddie Case   [phone]    History of Present Illness:    Ms. Fernando Hendrix follows up today for SVT. She is a little nervous today and BP is up a bit. She forgot to take her cardizem and felt very good without it. She want to know if we can try to stop it. Also having itching all over, changed her soap, has some in her scalp. Also the bystolic is expensive, wants to trial a different bblocker and trial stopping the diltiazem. Discussed Yoga and meditation practice. Also has not done much exercise recently but will get back to it. She had her medications adjusted and was placed on Bystolic and Diltiazem. Since her hospital discharge, she has been doing okay. She has rare palpitations. While she has been tolerating the medications okay, she overall feels more tired than usual.  She does not know if this is due to adjustment in thyroid medication. She also had an echocardiogram that noted EF of 45%. She is compensated on exam with clear lungs. Blood pressure is 140/90 with a heart rate of 56 beats per minute. Assessment and Plan:   1. Paroxysmal supraventricular tachycardia. She had a history of SVT in the past and ablation and now this has recurred. Overall, this seems controlled okay on Bystolic and Diltiazem. However, she does have baseline fatigue and she is not sure how much of this is thyroid, how much of this may be related to the medication. Will trial stopping dilt. Change Bystolic to night. Consider change to toprol for cost if needed. Fu in 6 weeks with echo to look at EF. If still low will need to intensify therapy and may consider further ischemia workup. 2. Cardiomyopathy. Could have been tachycardia induced. Would consider repeat echocardiogram in three to six months. If this is still low, would consider additional ischemic evaluation and we talked a little bit about this. 3. Fibromyalgia. 4. Hypothyroid.        She  has a past medical history of Fibromyalgia; Hypothyroid; and SVT (supraventricular tachycardia) ([address] All other systems negative except as above. PE  Vitals:    06/26/18 0826   BP: 130/90   Pulse: 66   Resp: 16   Weight: 136 lb 12.8 oz (62.1 kg)   Height: 5' 5\" (1.651 m)    Body mass index is 22.76 kg/(m^2).    General appearance - alert, well appearing, and in no distress  Mental status - affect appropriate to mood  Eyes - sclera anicteric, moist mucous membranes  Neck - supple, no JVD  Chest - clear to auscultation, no wheezes, rales or rhonchi  Heart - normal rate, regular rhythm, normal S1, S2, no murmurs, rubs, clicks or gallops  Abdomen - soft, nontender, nondistended, no masses or organomegaly  Back exam - full range of motion, no tenderness  Neurological - no focal deficit  Extremities - peripheral pulses normal, no pedal edema      Recent Labs:  No results found for: CHOL, CHOLX, CHLST, CHOLV, 574463, HDL, LDL, LDLC, DLDLP, TGLX, TRIGL, TRIGP, CHHD, CHHDX  Lab Results   Component Value Date/Time    Creatinine 0.54 (L) 04/21/2018 02:47 AM     Lab Results   Component Value Date/Time    BUN 10 04/21/2018 02:47 AM     Lab Results   Component Value Date/Time    Potassium 3.3 (L) 04/21/2018 02:47 AM     No results found for: HBA1C, HGBE8, GQM8LQPT, HAC2XXKF  Lab Results   Component Value Date/Time    HGB 12.8 04/21/2018 02:47 AM     Lab Results   Component Value Date/Time    PLATELET 786 58/95/0029 02:47 AM       Reviewed:  Past Medical History:   Diagnosis Date    Fibromyalgia     Hypothyroid     SVT (supraventricular tachycardia) (HCC)      History   Smoking Status    Never Smoker   Smokeless Tobacco    Never Used     History   Alcohol Use No     Allergies   Allergen Reactions    Codeine Other (comments)     Hallucinations     Erythromycin Nausea Only and Other (comments)     Patient describes overall \"weird feeling\" when taking medication    Sulfa (Sulfonamide Antibiotics) Unknown (comments)       Current Outpatient Prescriptions   Medication Sig    levothyroxine (SYNTHROID) 137 mcg tablet Take  by mouth Daily (before breakfast).  nebivolol (BYSTOLIC) 5 mg tablet Take 1 Tab by mouth daily.  dilTIAZem CD (CARDIZEM CD) 120 mg ER capsule Take 1 Cap by mouth daily.  magnesium oxide (MAG-OX) 400 mg tablet Take 1 Tab by mouth daily.  Thyroid, Pork, (NATURE-THROID) 65 mg tab Take 1 Tab by mouth daily.  doxycycline (ADOXA) 100 mg tablet Take 1 Tab by mouth two (2) times a day.  levothyroxine (LEVOXYL) 112 mcg tablet Take 112 mcg by mouth Daily (before breakfast). No current facility-administered medications for this visit.         Rayray Hayden MD  [facility] [place]  [facility], [address] [facility]  [address]

## 2018-06-27 DIAGNOSIS — I47.1 SVT (SUPRAVENTRICULAR TACHYCARDIA) (HCC): Primary | ICD-10-CM

## 2018-06-27 DIAGNOSIS — I42.9 CARDIOMYOPATHY, UNSPECIFIED TYPE (HCC): ICD-10-CM

## 2018-08-14 ENCOUNTER — OFFICE VISIT (OUTPATIENT)
Dept: CARDIOLOGY CLINIC | Age: 68
End: 2018-08-14

## 2018-08-14 ENCOUNTER — CLINICAL SUPPORT (OUTPATIENT)
Dept: CARDIOLOGY CLINIC | Age: 68
End: 2018-08-14

## 2018-08-14 VITALS
WEIGHT: 136 LBS | RESPIRATION RATE: 16 BRPM | BODY MASS INDEX: 22.66 KG/M2 | DIASTOLIC BLOOD PRESSURE: 100 MMHG | SYSTOLIC BLOOD PRESSURE: 150 MMHG | HEIGHT: 65 IN | HEART RATE: 150 BPM

## 2018-08-14 DIAGNOSIS — I47.1 SVT (SUPRAVENTRICULAR TACHYCARDIA) (HCC): ICD-10-CM

## 2018-08-14 DIAGNOSIS — I42.9 CARDIOMYOPATHY, UNSPECIFIED TYPE (HCC): Primary | ICD-10-CM

## 2018-08-14 DIAGNOSIS — I42.9 CARDIOMYOPATHY, UNSPECIFIED TYPE (HCC): ICD-10-CM

## 2018-08-14 RX ORDER — NEBIVOLOL 10 MG/1
10 TABLET ORAL DAILY
Qty: 90 TAB | Refills: 3 | Status: SHIPPED | OUTPATIENT
Start: 2018-08-14

## 2018-08-14 RX ORDER — LEVOTHYROXINE SODIUM 125 UG/1
TABLET ORAL
COMMUNITY

## 2018-08-14 RX ORDER — NEBIVOLOL 5 MG/1
5 TABLET ORAL DAILY
Qty: 7 TAB | Refills: 0 | Status: SHIPPED | COMMUNITY
Start: 2018-08-14 | End: 2018-08-14 | Stop reason: SDUPTHER

## 2018-08-14 NOTE — PATIENT INSTRUCTIONS
Please increase your bystolic to 03XL daily  Take your diltiazem(cardizem) with you on your trip. If you have a recurrence of SVT and your blood pressure is over 120, please take one pill.

## 2018-08-14 NOTE — MR AVS SNAPSHOT
[address] [facility] [address] [place] 
[phone] Patient: Emerson Chu MRN: [id number] :1950 Visit Information Date & Time Provider Department Dept. Phone Encounter #  
 2018 12:15 PM Kaur Reeves, [address] [phone] [id number] Your Appointments 2018  8:00 AM  
NUCLEAR MEDICINE with ZHANNA CAUSEY [facility] (ABEL SCHEDULING) Appt Note: 1 day Lexiscan ht wt 8:00 Dr. Soila Apodaca 12:15 kmr [address] [facility] [address] [facility] [address]  
[address] [facility]  
  
    
 2018 12:15 PM  
ESTABLISHED PATIENT with aKur Reeves MD  
[facility] ([address]) Appt Note: 1 day Lexiscan ht wt 8:00 Dr. Soila Apodaca 12:15 kmr [address] [place]  
[address] [facility],[address] [address] Upcoming Health Maintenance Date Due Hepatitis C Screening 1950 DTaP/Tdap/Td series (1 - Tdap) 10/10/1969 BREAST CANCER SCRN MAMMOGRAM 10/10/1998 FOBT Q 1 YEAR AGE 50-75 10/10/1998 ZOSTER VACCINE AGE 60> 8/10/2008 GLAUCOMA SCREENING Q2Y 10/10/2013 Bone Densitometry (Dexa) Screening 10/10/2013 Pneumococcal 65+ Low/Medium Risk (1 of 2 - PCV13) 10/10/2013 MEDICARE YEARLY EXAM 2018 Influenza Age 5 to Adult 2018 Allergies as of 2018  Review Complete On: 2018 By: Hali Candelaria Severity Noted Reaction Type Reactions Codeine  2018    Other (comments) Hallucinations Erythromycin  2018    Nausea Only, Other (comments) Patient describes overall \"weird feeling\" when taking medication Sulfa (Sulfonamide Antibiotics)  2018    Unknown (comments) Current Immunizations  Never Reviewed No immunizations on file. Not reviewed this visit You Were Diagnosed With   
  
 Codes Comments Cardiomyopathy, unspecified type ([facility]    -  Primary ICD-10-CM: I42.9 ICD-9-CM: 425.4 SVT (supraventricular tachycardia) (HCC)     ICD-10-CM: I47.1 ICD-9-CM: 427.89 Vitals BP Pulse Resp Height(growth percentile) Weight(growth percentile) BMI  
 (!) 150/100 (BP 1 Location: Right arm, BP Patient Position: Sitting) (!) 150 16 5' 5\" (1.651 m) 136 lb (61.7 kg) 22.63 kg/m2 OB Status Smoking Status Postmenopausal Never Smoker Vitals History BMI and BSA Data Body Mass Index Body Surface Area  
 22.63 kg/m 2 1.68 m 2 Your Updated Medication List  
  
   
This list is accurate as of 8/14/18  1:13 PM.  Always use your most recent med list.  
  
  
  
  
 dilTIAZem  mg ER capsule Commonly known as:  CARDIZEM CD Take 1 Cap by mouth daily. doxycycline 100 mg tablet Commonly known as:  ADOXA Take 1 Tab by mouth two (2) times a day. * LEVOXYL 112 mcg tablet Generic drug:  levothyroxine Take 112 mcg by mouth Daily (before breakfast). * levothyroxine 137 mcg tablet Commonly known as:  SYNTHROID Take  by mouth Daily (before breakfast). * levothyroxine 125 mcg tablet Commonly known as:  SYNTHROID Take  by mouth Daily (before breakfast). magnesium oxide 400 mg tablet Commonly known as:  MAG-OX Take 1 Tab by mouth daily. NATURE-THROID 65 mg Tab Generic drug:  Thyroid (Pork) Take 1 Tab by mouth daily. nebivolol 10 mg tablet Commonly known as:  BYSTOLIC Take 1 Tab by mouth daily. * Notice: This list has 3 medication(s) that are the same as other medications prescribed for you. Read the directions carefully, and ask your doctor or other care provider to review them with you. Prescriptions Printed  Refills  
 nebivolol (BYSTOLIC) 10 mg tablet 3  
 Sig: Take 1 Tab by mouth daily. Class: Print Route: Oral  
  
We Performed the Following AMB POC EKG ROUTINE W/ 12 LEADS, INTER & REP [75865 CPT(R)] Patient Instructions Please increase your bystolic to 80NJ daily Take your diltiazem(cardizem) with you on your trip. If you have a recurrence of SVT and your blood pressure is over 120, please take one pill. Introducing [facility]! Dear Pablo Sayer: Thank you for requesting a [employer] account. Our records indicate that you already have an active [employer] account. You can access your account anytime at [email or web address] Did you know that you can access your hospital and ER discharge instructions at any time in [employer]? You can also review all of your test results from your hospital stay or ER visit. Additional Information If you have questions, please visit the Frequently Asked Questions section of the [employer] website at [email or web address] Remember, [employer] is NOT to be used for urgent needs. For medical emergencies, dial 911. Now available from your iPhone and Android! Please provide this summary of care documentation to your next provider. Your primary care clinician is listed as Bethanie Espinoza. If you have any questions after today's visit, please call [phone].

## 2018-08-14 NOTE — PROGRESS NOTES
[place]: Sushant Mokn   [phone]    History of Present Illness:    Ms. Hollace Baumgarten follows up today for SVT. She remains quite anxious, which drives her elevated Bp and more SVT. She is visibly shaking today. Multiple runs of SVT while getting her echo done. Discussed Yoga and meditation practice. Also has not done much exercise recently but will get back to it. Has tried a little since her last visit here. She is afraid of doctors. Had a lot of stress last week. She is anxious and worried about getting on the plane for vacation. She has not been checking her BP related to anxiety over whether it will be high. Did check it yesterday and it was high 138/70. She wants to have her lexiscan done at [facility]. After discussion decided to have it here so we could go over the results immediately afterwards. Will do that to r/o blockage given her WMA. She had her medications adjusted and was placed on Bystolic and Diltiazem. While she has been tolerating the medications okay, she overall feels more tired than usual.  She does not know if this is due to adjustment in thyroid medication. She also had an echocardiogram that noted EF of 45%. She is compensated on exam with clear lungs. Blood pressure is 140/90 with a heart rate of 56 beats per minute. Assessment and Plan:   1. Paroxysmal supraventricular tachycardia. She had a history of SVT in the past and ablation and now this has recurred. Overall, this seems out of control today despite Bystolic and Diltiazem. However, she does have baseline fatigue and she is not sure how much of this is thyroid, how much of this may be related to the medication. Trialed stopping dilt. Changed Bystolic to night. Now will uptitrate Bystolic to 50JU HS. Consider change to toprol for cost if needed. 2. WMA on echo-focal apical thinning and wlal motion abnormality on echo so will proceed with stress testing to assess for ischemia. Discussed stress and the heart. 3. Cardiomyopathy. Ef is overall better today. 4. Fibromyalgia. 5. Hypothyroid. 6. Anxiety- a major issue for her. 7. Valvular heart disease- mild, mr, tr, pi    Echo 8/18 EF 56% apical septal thinning and HK/Dk, septal bounce. Mild MR, mild TR, mild PI  She  has a past medical history of Fibromyalgia; Hypothyroid; and SVT (supraventricular tachycardia) ([address] All other systems negative except as above. PE  Vitals:    08/14/18 1147   BP: (!) 150/100   Pulse: (!) 150   Resp: 16   Height: 5' 5\" (1.651 m)    There is no height or weight on file to calculate BMI.    General appearance - alert, well appearing, and in no distress  Mental status - affect appropriate to mood  Eyes - sclera anicteric, moist mucous membranes  Neck - supple, no JVD  Chest - clear to auscultation, no wheezes, rales or rhonchi  Heart - normal rate, regular rhythm, normal S1, S2, no murmurs, rubs, clicks or gallops  Abdomen - soft, nontender, nondistended, no masses or organomegaly  Back exam - full range of motion, no tenderness  Neurological - no focal deficit  Extremities - peripheral pulses normal, no pedal edema      Recent Labs:  No results found for: CHOL, CHOLX, CHLST, CHOLV, 210253, HDL, LDL, LDLC, DLDLP, TGLX, TRIGL, TRIGP, CHHD, CHHDX  Lab Results   Component Value Date/Time    Creatinine 0.54 (L) 04/21/2018 02:47 AM     Lab Results   Component Value Date/Time    BUN 10 04/21/2018 02:47 AM     Lab Results   Component Value Date/Time    Potassium 3.3 (L) 04/21/2018 02:47 AM     No results found for: HBA1C, HGBE8, UIN6QPJE, EXN9CGJW  Lab Results   Component Value Date/Time    HGB 12.8 04/21/2018 02:47 AM     Lab Results   Component Value Date/Time    PLATELET 278 28/55/0368 02:47 AM       Reviewed:  Past Medical History:   Diagnosis Date    Fibromyalgia     Hypothyroid     SV[facility]supra[facility]ricular tachy[facility]) ([facility])      History   Smoking Status    Never Smoker   Smokeless Tobacco    Never Used     History   Alcohol Use No     Allergies   Allergen Reactions    Codeine Other (comments)     Hallucinations     Erythromycin Nausea Only and Other (comments)     Patient describes overall \"weird feeling\" when taking medication    Sulfa (Sulfonamide Antibiotics) Unknown (comments)       Current Outpatient Prescriptions   Medication Sig    levothyroxine (SYNTHROID) 125 mcg tablet Take  by mouth Daily (before breakfast).  nebivolol (BYSTOLIC) 5 mg tablet Take 1 Tab by mouth daily.  levothyroxine (LEVOXYL) 112 mcg tablet Take 112 mcg by mouth Daily (before breakfast).  levothyroxine (SYNTHROID) 137 mcg tablet Take  by mouth Daily (before breakfast).  Thyroid, Pork, (NATURE-THROID) 65 mg tab Take 1 Tab by mouth daily.  doxycycline (ADOXA) 100 mg tablet Take 1 Tab by mouth two (2) times a day.  dilTIAZem CD (CARDIZEM CD) 120 mg ER capsule Take 1 Cap by mouth daily.  magnesium oxide (MAG-OX) 400 mg tablet Take 1 Tab by mouth daily. No current facility-administered medications for this visit. Social History     Social History    Marital status:      Spouse name: N/A    Number of children: N/A    Years of education: N/A     Occupational History    Not on file. Social History Main Topics    Smoking status: Never Smoker    Smokeless tobacco: Never Used    Alcohol use No    Drug use: No    Sexual activity: Not on file     Other Topics Concern    Not on file     Social History Narrative   No family history on file.      Fhx no early CAD  Danielle Milian MD  [facility] [place]  [address] [facility]  [address]

## 2018-08-30 ENCOUNTER — TELEPHONE (OUTPATIENT)
Dept: CARDIOLOGY CLINIC | Age: 68
End: 2018-08-30

## 2018-08-30 NOTE — TELEPHONE ENCOUNTER
----- Message from Jacinta Bonilla sent at 8/30/2018 11:45 AM EDT -----      ----- Message -----     From: Ayan Rocha MD     Sent: 8/30/2018  10:19 AM       To: Araceli Gan RN    wnl

## 2018-09-10 NOTE — TELEPHONE ENCOUNTER
Spoke to patient and advised her that her echo is within normal limits, patient verbalized understanding.     Patient id verified x2

## 2022-03-18 PROBLEM — I47.1 SVT (SUPRAVENTRICULAR TACHYCARDIA) (HCC): Status: ACTIVE | Noted: 2018-04-20

## 2022-03-19 PROBLEM — I42.9 CARDIOMYOPATHY (HCC): Status: ACTIVE | Noted: 2018-06-26

## 2023-02-21 ENCOUNTER — APPOINTMENT (OUTPATIENT)
Dept: NON INVASIVE DIAGNOSTICS | Age: 73
End: 2023-02-21
Attending: HOSPITALIST
Payer: MEDICARE

## 2023-02-21 ENCOUNTER — APPOINTMENT (OUTPATIENT)
Dept: GENERAL RADIOLOGY | Age: 73
End: 2023-02-21
Attending: STUDENT IN AN ORGANIZED HEALTH CARE EDUCATION/TRAINING PROGRAM
Payer: MEDICARE

## 2023-02-21 ENCOUNTER — HOSPITAL ENCOUNTER (EMERGENCY)
Age: 73
Discharge: HOME | End: 2023-02-21
Attending: STUDENT IN AN ORGANIZED HEALTH CARE EDUCATION/TRAINING PROGRAM | Admitting: HOSPITALIST
Payer: MEDICARE

## 2023-02-21 VITALS
HEART RATE: 74 BPM | DIASTOLIC BLOOD PRESSURE: 79 MMHG | RESPIRATION RATE: 16 BRPM | SYSTOLIC BLOOD PRESSURE: 110 MMHG | OXYGEN SATURATION: 97 % | TEMPERATURE: 98.1 F

## 2023-02-21 DIAGNOSIS — R00.0 SINUS TACHYCARDIA: Primary | ICD-10-CM

## 2023-02-21 DIAGNOSIS — R00.2 PALPITATIONS: ICD-10-CM

## 2023-02-21 PROBLEM — I47.1 SVT (SUPRAVENTRICULAR TACHYCARDIA) (HCC): Chronic | Status: ACTIVE | Noted: 2018-04-20

## 2023-02-21 LAB
ALBUMIN SERPL-MCNC: 4 G/DL (ref 3.5–5)
ALBUMIN/GLOB SERPL: 1 (ref 1.1–2.2)
ALP SERPL-CCNC: 70 U/L (ref 45–117)
ALT SERPL-CCNC: 20 U/L (ref 12–78)
ANION GAP SERPL CALC-SCNC: 7 MMOL/L (ref 5–15)
AST SERPL-CCNC: 20 U/L (ref 15–37)
ATRIAL RATE: 115 BPM
ATRIAL RATE: 134 BPM
ATRIAL RATE: 78 BPM
BASOPHILS # BLD: 0.1 K/UL (ref 0–0.1)
BASOPHILS NFR BLD: 1 % (ref 0–1)
BILIRUB SERPL-MCNC: 0.4 MG/DL (ref 0.2–1)
BNP SERPL-MCNC: 1402 PG/ML
BUN SERPL-MCNC: 19 MG/DL (ref 6–20)
BUN/CREAT SERPL: 24 (ref 12–20)
CALCIUM SERPL-MCNC: 9.7 MG/DL (ref 8.5–10.1)
CALCULATED P AXIS, ECG09: 12 DEGREES
CALCULATED P AXIS, ECG09: 40 DEGREES
CALCULATED P AXIS, ECG09: 54 DEGREES
CALCULATED R AXIS, ECG10: -31 DEGREES
CALCULATED R AXIS, ECG10: -36 DEGREES
CALCULATED R AXIS, ECG10: 122 DEGREES
CALCULATED T AXIS, ECG11: 10 DEGREES
CALCULATED T AXIS, ECG11: 48 DEGREES
CALCULATED T AXIS, ECG11: 9 DEGREES
CHLORIDE SERPL-SCNC: 102 MMOL/L (ref 97–108)
CO2 SERPL-SCNC: 27 MMOL/L (ref 21–32)
COMMENT, HOLDF: NORMAL
CREAT SERPL-MCNC: 0.8 MG/DL (ref 0.55–1.02)
D DIMER PPP FEU-MCNC: 0.19 MG/L FEU (ref 0–0.65)
DIAGNOSIS, 93000: NORMAL
DIFFERENTIAL METHOD BLD: NORMAL
ECHO LV EJECTION FRACTION A2C: 62 %
ECHO LV EJECTION FRACTION A4C: 46 %
ECHO LV FRACTIONAL SHORTENING: 16 % (ref 28–44)
ECHO LV INTERNAL DIMENSION DIASTOLIC: 6.1 CM (ref 3.9–5.3)
ECHO LV INTERNAL DIMENSION SYSTOLIC: 5.1 CM
ECHO RV INTERNAL DIMENSION: 2.9 CM
EOSINOPHIL # BLD: 0 K/UL (ref 0–0.4)
EOSINOPHIL NFR BLD: 0 % (ref 0–7)
ERYTHROCYTE [DISTWIDTH] IN BLOOD BY AUTOMATED COUNT: 12.7 % (ref 11.5–14.5)
GLOBULIN SER CALC-MCNC: 4.2 G/DL (ref 2–4)
GLUCOSE SERPL-MCNC: 170 MG/DL (ref 65–100)
HCT VFR BLD AUTO: 46.1 % (ref 35–47)
HGB BLD-MCNC: 15 G/DL (ref 11.5–16)
IMM GRANULOCYTES # BLD AUTO: 0 K/UL (ref 0–0.04)
IMM GRANULOCYTES NFR BLD AUTO: 0 % (ref 0–0.5)
INR PPP: 1 (ref 0.9–1.1)
LYMPHOCYTES # BLD: 1.4 K/UL (ref 0.8–3.5)
LYMPHOCYTES NFR BLD: 24 % (ref 12–49)
MAGNESIUM SERPL-MCNC: 2.2 MG/DL (ref 1.6–2.4)
MCH RBC QN AUTO: 29.9 PG (ref 26–34)
MCHC RBC AUTO-ENTMCNC: 32.5 G/DL (ref 30–36.5)
MCV RBC AUTO: 92 FL (ref 80–99)
MONOCYTES # BLD: 0.4 K/UL (ref 0–1)
MONOCYTES NFR BLD: 7 % (ref 5–13)
NEUTS SEG # BLD: 4.1 K/UL (ref 1.8–8)
NEUTS SEG NFR BLD: 68 % (ref 32–75)
NRBC # BLD: 0 K/UL (ref 0–0.01)
NRBC BLD-RTO: 0 PER 100 WBC
P-R INTERVAL, ECG05: 142 MS
P-R INTERVAL, ECG05: 166 MS
P-R INTERVAL, ECG05: 170 MS
PLATELET # BLD AUTO: 238 K/UL (ref 150–400)
PMV BLD AUTO: 10.7 FL (ref 8.9–12.9)
POTASSIUM SERPL-SCNC: 3.9 MMOL/L (ref 3.5–5.1)
PROT SERPL-MCNC: 8.2 G/DL (ref 6.4–8.2)
PROTHROMBIN TIME: 10.3 SEC (ref 9–11.1)
Q-T INTERVAL, ECG07: 294 MS
Q-T INTERVAL, ECG07: 332 MS
Q-T INTERVAL, ECG07: 406 MS
QRS DURATION, ECG06: 102 MS
QRS DURATION, ECG06: 104 MS
QRS DURATION, ECG06: 92 MS
QTC CALCULATION (BEZET), ECG08: 439 MS
QTC CALCULATION (BEZET), ECG08: 459 MS
QTC CALCULATION (BEZET), ECG08: 462 MS
RBC # BLD AUTO: 5.01 M/UL (ref 3.8–5.2)
SAMPLES BEING HELD,HOLD: NORMAL
SODIUM SERPL-SCNC: 136 MMOL/L (ref 136–145)
TROPONIN I SERPL HS-MCNC: 7 NG/L (ref 0–37)
TSH SERPL DL<=0.05 MIU/L-ACNC: 1.16 UIU/ML (ref 0.36–3.74)
VENTRICULAR RATE, ECG03: 115 BPM
VENTRICULAR RATE, ECG03: 134 BPM
VENTRICULAR RATE, ECG03: 78 BPM
WBC # BLD AUTO: 6 K/UL (ref 3.6–11)

## 2023-02-21 PROCEDURE — 85025 COMPLETE CBC W/AUTO DIFF WBC: CPT

## 2023-02-21 PROCEDURE — 93005 ELECTROCARDIOGRAM TRACING: CPT

## 2023-02-21 PROCEDURE — 85610 PROTHROMBIN TIME: CPT

## 2023-02-21 PROCEDURE — 83880 ASSAY OF NATRIURETIC PEPTIDE: CPT

## 2023-02-21 PROCEDURE — 84484 ASSAY OF TROPONIN QUANT: CPT

## 2023-02-21 PROCEDURE — 74011000250 HC RX REV CODE- 250: Performed by: HOSPITALIST

## 2023-02-21 PROCEDURE — 85379 FIBRIN DEGRADATION QUANT: CPT

## 2023-02-21 PROCEDURE — 71045 X-RAY EXAM CHEST 1 VIEW: CPT

## 2023-02-21 PROCEDURE — 84443 ASSAY THYROID STIM HORMONE: CPT

## 2023-02-21 PROCEDURE — 96361 HYDRATE IV INFUSION ADD-ON: CPT

## 2023-02-21 PROCEDURE — 74011250636 HC RX REV CODE- 250/636: Performed by: STUDENT IN AN ORGANIZED HEALTH CARE EDUCATION/TRAINING PROGRAM

## 2023-02-21 PROCEDURE — 93308 TTE F-UP OR LMTD: CPT | Performed by: SPECIALIST

## 2023-02-21 PROCEDURE — 96365 THER/PROPH/DIAG IV INF INIT: CPT

## 2023-02-21 PROCEDURE — 93308 TTE F-UP OR LMTD: CPT

## 2023-02-21 PROCEDURE — 36415 COLL VENOUS BLD VENIPUNCTURE: CPT

## 2023-02-21 PROCEDURE — 96366 THER/PROPH/DIAG IV INF ADDON: CPT

## 2023-02-21 PROCEDURE — 74011000258 HC RX REV CODE- 258: Performed by: HOSPITALIST

## 2023-02-21 PROCEDURE — 83735 ASSAY OF MAGNESIUM: CPT

## 2023-02-21 PROCEDURE — 99285 EMERGENCY DEPT VISIT HI MDM: CPT

## 2023-02-21 PROCEDURE — 65660000001 HC RM ICU INTERMED STEPDOWN

## 2023-02-21 PROCEDURE — 80053 COMPREHEN METABOLIC PANEL: CPT

## 2023-02-21 RX ORDER — NEBIVOLOL 5 MG/1
10 TABLET ORAL DAILY
Status: DISCONTINUED | OUTPATIENT
Start: 2023-02-21 | End: 2023-02-21 | Stop reason: HOSPADM

## 2023-02-21 RX ORDER — SODIUM CHLORIDE 0.9 % (FLUSH) 0.9 %
5-40 SYRINGE (ML) INJECTION AS NEEDED
Status: DISCONTINUED | OUTPATIENT
Start: 2023-02-21 | End: 2023-02-21 | Stop reason: HOSPADM

## 2023-02-21 RX ORDER — ACETAMINOPHEN 325 MG/1
650 TABLET ORAL
Status: DISCONTINUED | OUTPATIENT
Start: 2023-02-21 | End: 2023-02-21 | Stop reason: HOSPADM

## 2023-02-21 RX ORDER — DILTIAZEM HYDROCHLORIDE 120 MG/1
120 CAPSULE, COATED, EXTENDED RELEASE ORAL DAILY
Status: DISCONTINUED | OUTPATIENT
Start: 2023-02-21 | End: 2023-02-21 | Stop reason: HOSPADM

## 2023-02-21 RX ORDER — SODIUM CHLORIDE 0.9 % (FLUSH) 0.9 %
5-40 SYRINGE (ML) INJECTION EVERY 8 HOURS
Status: DISCONTINUED | OUTPATIENT
Start: 2023-02-21 | End: 2023-02-21 | Stop reason: HOSPADM

## 2023-02-21 RX ORDER — ONDANSETRON 4 MG/1
4 TABLET, ORALLY DISINTEGRATING ORAL
Status: DISCONTINUED | OUTPATIENT
Start: 2023-02-21 | End: 2023-02-21 | Stop reason: HOSPADM

## 2023-02-21 RX ORDER — METOPROLOL TARTRATE 25 MG/1
12.5 TABLET, FILM COATED ORAL 2 TIMES DAILY
Qty: 15 TABLET | Refills: 0 | Status: SHIPPED | OUTPATIENT
Start: 2023-02-21

## 2023-02-21 RX ORDER — ONDANSETRON 2 MG/ML
4 INJECTION INTRAMUSCULAR; INTRAVENOUS
Status: DISCONTINUED | OUTPATIENT
Start: 2023-02-21 | End: 2023-02-21 | Stop reason: HOSPADM

## 2023-02-21 RX ORDER — POLYETHYLENE GLYCOL 3350 17 G/17G
17 POWDER, FOR SOLUTION ORAL DAILY PRN
Status: DISCONTINUED | OUTPATIENT
Start: 2023-02-21 | End: 2023-02-21 | Stop reason: HOSPADM

## 2023-02-21 RX ORDER — ACETAMINOPHEN 650 MG/1
650 SUPPOSITORY RECTAL
Status: DISCONTINUED | OUTPATIENT
Start: 2023-02-21 | End: 2023-02-21 | Stop reason: HOSPADM

## 2023-02-21 RX ADMIN — DILTIAZEM HYDROCHLORIDE 2.5 MG/HR: 5 INJECTION, SOLUTION INTRAVENOUS at 11:47

## 2023-02-21 RX ADMIN — SODIUM CHLORIDE 1000 ML: 9 INJECTION, SOLUTION INTRAVENOUS at 08:58

## 2023-02-21 NOTE — H&P
History and Physical    Date of Service:  2/21/2023  Primary Care Provider: Tanner Dance, MD  Source of information: The patient    Chief Complaint: Rapid Heart Rate      History of Presenting Illness:   Ronald Pickett is a 74 y.o. female who presents with palpitation. Patient has past medical history of hypothyroidism on Synthroid SVT status postcardiac ablation 20 years ago fibromyalgia not on any medication woke up at 4 AM with feeling of heart racing she took her Bystolic which was prescribed by cardiologist Dr. Ora Wise. Patient denies any dizziness shortness of breath syncope or chest pain. No recent change in medication she has a primary care physician she has been followed by an electrophysiologist in [place]. Patient was admitted for further management    The patient denies any headache, blurry vision, sore throat, trouble swallowing, trouble with speech, chest pain, SOB, cough, fever, chills, N/V/D, abd pain, urinary symptoms, constipation, recent travels, sick contacts, focal or generalized neurological symptoms, falls, injuries, rashes, contact with COVID-19 diagnosed patients, hematemesis, melena, hemoptysis, hematuria, rashes, denies starting any new medications and denies any other concerns or problems besides as mentioned above. REVIEW OF SYSTEMS:  Pertinent items are noted in the History of Present Illness. Past Medical History:   Diagnosis Date    Fibromyalgia     Hypothyroid     SVT (supraventricular tachycardia) (HCC)       Past Surgical History:   Procedure Laterality Date    HX OTHER SURGICAL      removal of IUD    HX SVT ABLATION      HX TONSILLECTOMY       Prior to Admission medications    Medication Sig Start Date End Date Taking? Authorizing Provider   levothyroxine (SYNTHROID) 125 mcg tablet Take  by mouth Daily (before breakfast). Provider, Historical   nebivolol (BYSTOLIC) 10 mg tablet Take 1 Tab by mouth daily.  8/14/18   Ora Wise Justice Nogueira MD   levothyroxine (SYNTHROID) 137 mcg tablet Take  by mouth Daily (before breakfast). Provider, Historical   Thyroid, Pork, (NATURE-THROID) 65 mg tab Take 1 Tab by mouth daily. Provider, Historical   doxycycline (ADOXA) 100 mg tablet Take 1 Tab by mouth two (2) times a day. 4/25/18   Provider, Historical   dilTIAZem CD (CARDIZEM CD) 120 mg ER capsule Take 1 Cap by mouth daily. 4/21/18   Cal Andrews MD   magnesium oxide (MAG-OX) 400 mg tablet Take 1 Tab by mouth daily. 4/22/18   Pham Horta MD   levothyroxine (LEVOXYL) 112 mcg tablet Take 112 mcg by mouth Daily (before breakfast). Provider, Historical     Allergies   Allergen Reactions    Codeine Other (comments)     Hallucinations     Erythromycin Nausea Only and Other (comments)     Patient describes overall \"weird feeling\" when taking medication    Sulfa (Sulfonamide Antibiotics) Unknown (comments)      No family history on file. Social History:  reports that she has never smoked. She has never used smokeless tobacco. She reports that she does not drink alcohol and does not use drugs. Social Determinants of Health     Tobacco Use: Not on file   Alcohol Use: Not on file   Financial Resource Strain: Not on file   Food Insecurity: Not on file   Transportation Needs: Not on file   Physical Activity: Not on file   Stress: Not on file   Social Connections: Not on file   Intimate Partner Violence: Not on file   Depression: Not on file   Housing Stability: Not on file        Medications were reconciled to the best of my ability given all available resources at the time of admission. Route is PO if not otherwise noted. Family and social history were personally reviewed, all pertinent and relevant details are outlined as above.     Objective:   Visit Vitals  BP (!) 179/97   Pulse (!) 122   Temp 97.1 °F (36.2 °C)   Resp 14   SpO2 95%      O2 Device: None (Room air)    PHYSICAL EXAM:   General: Alert x oriented x 3, awake, no acute distress,   HEENT: PEERL, EOMI, moist mucus membranes  Neck: Supple, no JVD, no meningeal signs  Chest: Clear to auscultation bilaterally   CVS: RRR, S1 S2 heard, no murmurs/rubs/gallops  Abd: Soft, non-tender, non-distended, +bowel sounds   Ext: No clubbing, no cyanosis, no edema  Neuro/Psych: Pleasant mood and affect, CN 2-12 grossly intact, sensory grossly within normal limit, Strength 5/5 in all extremities, DTR 1+ x 4  Cap refill: Brisk, less than 3 seconds  Pulses: 2+, symmetric in all extremities  Skin: Warm, dry, without rashes or lesions    Data Review:   I have independently reviewed and interpreted patient's lab and all other diagnostic data    Abnormal Labs Reviewed   METABOLIC PANEL, COMPREHENSIVE - Abnormal; Notable for the following components:       Result Value    Glucose 170 (*)     BUN/Creatinine ratio 24 (*)     Globulin 4.2 (*)     A-G Ratio 1.0 (*)     All other components within normal limits   NT-PRO BNP - Abnormal; Notable for the following components:    NT pro-BNP 1,402 (*)     All other components within normal limits       All Micro Results       None            IMAGING:   XR CHEST PORT   Final Result   Clear lungs.            ECG/ECHO:    Results for orders placed or performed during the hospital encounter of 02/21/23   EKG, 12 LEAD, INITIAL   Result Value Ref Range    Ventricular Rate 134 BPM    Atrial Rate 134 BPM    P-R Interval 142 ms    QRS Duration 92 ms    Q-T Interval 294 ms    QTC Calculation (Bezet) 439 ms    Calculated P Axis 54 degrees    Calculated R Axis 122 degrees    Calculated T Axis 48 degrees    Diagnosis       Sinus tachycardia with occasional premature ventricular complexes  Right axis deviation  Nonspecific ST abnormality  Abnormal ECG  When compared with ECG of 20-APR-2018 13:35,  premature ventricular complexes are now present  QRS axis shifted right  ST now depressed in Inferior leads  Nonspecific T wave abnormality now evident in Inferior leads  T wave amplitude has decreased in Anterior leads            Notes reviewed from all clinical/nonclinical/nursing services involved in patient's clinical care. Care coordination discussions were held with appropriate clinical/nonclinical/ nursing providers based on care coordination needs. Assessment:   Given the patient's current clinical presentation, there is a high level of concern for decompensation if discharged from the emergency department. Complex decision making was performed, which includes reviewing the patient's available past medical records, laboratory results, and imaging studies. Principal Problem:    SVT (supraventricular tachycardia) ([address] (4/20/2018)        Plan:     SVT with palpitation  History of anxiety chronic pain  --Admit patient to telemetry  --Patient is on Bystolic heart rate in 485D will start Cardizem drip  --Since that she is very sensitive to medications so instead of giving oral Cardizem we will start with the drip first  --Check THS within normal limit will check free T4 T3  --Cardiology consulted echocardiogram ordered  --Patient probably will need electrophysiologist evaluation  --CAV group was consulted    Hypothyroidism  --Synthroid 125 ics per day  --Check THS T4 3-3    Fibromyalgia not on any medication follows with PCP        DIET: ADULT DIET Regular; Low Sodium (2 gm)   ISOLATION PRECAUTIONS: There are currently no Active Isolations  CODE STATUS: Full Code   DVT PROPHYLAXIS: Lovenox  FUNCTIONAL STATUS PRIOR TO HOSPITALIZATION: Fully active and ambulatory; able to carry on all self-care without restriction. Ambulatory status/function: By self   EARLY MOBILITY ASSESSMENT: Recommend routine ambulation while hospitalized with the assistance of nursing staff  ANTICIPATED DISCHARGE: 24-48 hours. ANTICIPATED DISPOSITION: Home  EMERGENCY CONTACT/SURROGATE DECISION MAKER:     CRITICAL CARE WAS PERFORMED FOR THIS ENCOUNTER: YES.  I had a face to face encounter with the patient, reviewed and interpreted patient data including clinical events, labs, images, vital signs, I/O's, and examined patient. I have discussed the case and the plan and management of the patient's care with the consulting services, the bedside nurses and necessary ancillary providers. This patient has a high probability of imminent, clinically significant deterioration, which requires the highest level of preparedness to intervene urgently. I participated in the decision-making and personally managed or directed the management of the following life and organ supporting interventions that required my frequent assessment to treat or prevent imminent deterioration. I personally spent 55 minutes of critical care time. This is time spent at this critically ill patient's bedside actively involved in patient care as well as the coordination of care and discussions with the patient's family. This does not include any procedural time which has been billed separately. Signed By: Adrienne Patterson MD     February 21, 2023         Please note that this dictation may have been completed with Dragon, the computer voice recognition software. Quite often unanticipated grammatical, syntax, homophones, and other interpretive errors are inadvertently transcribed by the computer software. Please disregard these errors. Please excuse any errors that have escaped final proofreading.

## 2023-02-21 NOTE — ED PROVIDER NOTES
Chief Complaint   Patient presents with    Rapid Heart Rate     This is a 26-year-old female with a history of hypothyroidism, SVT status post cardiac ablation over 20 years ago, fibromyalgia, presenting with intermittent palpitations over the past week acutely worse yesterday evening into today. She feels like her heart is racing, denies associated chest pain, shortness of breath, dizziness or syncope. Cites significant emotional stress recently. No recent changes to her medication regimen, she missed a dose of her thyroid medicine yesterday but otherwise reports good compliance. She was previously being followed by an electrophysiologist in [place]. Denies associated fevers, cough, abdominal pain, vomiting, diarrhea, or any other systemic complaints. No recent stimulant use. No other systemic complaints. Review of Systems   Constitutional:  Negative for fever. Respiratory:  Negative for shortness of breath. Cardiovascular:  Positive for palpitations. Negative for chest pain and leg swelling. Gastrointestinal:  Negative for abdominal pain, diarrhea and vomiting. Musculoskeletal:  Negative for back pain. Neurological:  Negative for dizziness and syncope. Past Medical History:   Diagnosis Date    Fibromyalgia     Hypothyroid     SVT (supraventricular tachycardia) (HCC)        Past Surgical History:   Procedure Laterality Date    HX OTHER SURGICAL      removal of IUD    HX SVT ABLATION      HX TONSILLECTOMY           No family history on file.     Social History     Socioeconomic History    Marital status:      Spouse name: Not on file    Number of children: Not on file    Years of education: Not on file    Highest education level: Not on file   Occupational History    Not on file   Tobacco Use    Smoking status: Never    Smokeless tobacco: Never   Substance and Sexual Activity    Alcohol use: No    Drug use: No    Sexual activity: Not on file   Other Topics Concern    Not on file   Social History Narrative    Not on file     Social Determinants of Health     Financial Resource Strain: Not on file   Food Insecurity: Not on file   Transportation Needs: Not on file   Physical Activity: Not on file   Stress: Not on file   Social Connections: Not on file   Intimate Partner Violence: Not on file   Housing Stability: Not on file         ALLERGIES: Codeine, Erythromycin, and Sulfa (sulfonamide antibiotics)      Vitals:    02/21/23 0822 02/21/23 0900 02/21/23 1021   BP: (!) 172/85 (!) 179/97    Pulse: (!) 119 (!) 116 (!) 122   Resp: 16 16 14   Temp: 97.1 °F (36.2 °C)     SpO2: 99% 95% 95%       Physical exam  General:  Awake and alert, NAD  HEENT:  NC/AT, equal pupils, moist mucous membranes  Neck:   Normal inspection, full range of motion  Cardiac:  +Tachycardic, regular rhythm, no murmurs  Respiratory:  Clear bilaterally, no wheezes, rales, rhonchi  Abdomen:  Soft and nontender, nondistended  Extremities: Warm and well perfused, no peripheral edema  Neuro:  Moving all extremities symmetrically without gross motor deficit  Skin:   No rashes, ecchymoses, or pallor      Recent Results (from the past 12 hour(s))   EKG, 12 LEAD, INITIAL    Collection Time: 02/21/23  8:16 AM   Result Value Ref Range    Ventricular Rate 134 BPM    Atrial Rate 134 BPM    P-R Interval 142 ms    QRS Duration 92 ms    Q-T Interval 294 ms    QTC Calculation (Bezet) 439 ms    Calculated P Axis 54 degrees    Calculated R Axis 122 degrees    Calculated T Axis 48 degrees    Diagnosis       Sinus tachycardia with occasional premature ventricular complexes  Right axis deviation  Nonspecific ST abnormality  Abnormal ECG  When compared with ECG of 20-APR-2018 13:35,  premature ventricular complexes are now present  QRS axis shifted right  ST now depressed in Inferior leads  Nonspecific T wave abnormality now evident in Inferior leads  T wave amplitude has decreased in Anterior leads     CBC WITH AUTOMATED DIFF    Collection Time: 02/21/23  8:29 AM   Result Value Ref Range    WBC 6.0 3.6 - 11.0 K/uL    RBC 5.01 3.80 - 5.20 M/uL    HGB 15.0 11.5 - 16.0 g/dL    HCT 46.1 35.0 - 47.0 %    MCV 92.0 80.0 - 99.0 FL    MCH 29.9 26.0 - 34.0 PG    MCHC 32.5 30.0 - 36.5 g/dL    RDW 12.7 11.5 - 14.5 %    PLATELET 388 516 - 466 K/uL    MPV 10.7 8.9 - 12.9 FL    NRBC 0.0 0  WBC    ABSOLUTE NRBC 0.00 0.00 - 0.01 K/uL    NEUTROPHILS 68 32 - 75 %    LYMPHOCYTES 24 12 - 49 %    MONOCYTES 7 5 - 13 %    EOSINOPHILS 0 0 - 7 %    BASOPHILS 1 0 - 1 %    IMMATURE GRANULOCYTES 0 0.0 - 0.5 %    ABS. NEUTROPHILS 4.1 1.8 - 8.0 K/UL    ABS. LYMPHOCYTES 1.4 0.8 - 3.5 K/UL    ABS. MONOCYTES 0.4 0.0 - 1.0 K/UL    ABS. EOSINOPHILS 0.0 0.0 - 0.4 K/UL    ABS. BASOPHILS 0.1 0.0 - 0.1 K/UL    ABS. IMM. GRANS. 0.0 0.00 - 0.04 K/UL    DF AUTOMATED     METABOLIC PANEL, COMPREHENSIVE    Collection Time: 02/21/23  8:29 AM   Result Value Ref Range    Sodium 136 136 - 145 mmol/L    Potassium 3.9 3.5 - 5.1 mmol/L    Chloride 102 97 - 108 mmol/L    CO2 27 21 - 32 mmol/L    Anion gap 7 5 - 15 mmol/L    Glucose 170 (H) 65 - 100 mg/dL    BUN 19 6 - 20 MG/DL    Creatinine 0.80 0.55 - 1.02 MG/DL    BUN/Creatinine ratio 24 (H) 12 - 20      eGFR >60 >60 ml/min/1.73m2    Calcium 9.7 8.5 - 10.1 MG/DL    Bilirubin, total 0.4 0.2 - 1.0 MG/DL    ALT (SGPT) 20 12 - 78 U/L    AST (SGOT) 20 15 - 37 U/L    Alk.  phosphatase 70 45 - 117 U/L    Protein, total 8.2 6.4 - 8.2 g/dL    Albumin 4.0 3.5 - 5.0 g/dL    Globulin 4.2 (H) 2.0 - 4.0 g/dL    A-G Ratio 1.0 (L) 1.1 - 2.2     TROPONIN-HIGH SENSITIVITY    Collection Time: 02/21/23  8:29 AM   Result Value Ref Range    Troponin-High Sensitivity 7 0 - 37 ng/L   MAGNESIUM    Collection Time: 02/21/23  8:29 AM   Result Value Ref Range    Magnesium 2.2 1.6 - 2.4 mg/dL   SAMPLES BEING HELD    Collection Time: 02/21/23  8:29 AM   Result Value Ref Range    SAMPLES BEING HELD 1RED     COMMENT        Add-on orders for these samples will be processed based on acceptable specimen integrity and analyte stability, which may vary by analyte. D DIMER    Collection Time: 02/21/23  8:29 AM   Result Value Ref Range    D-dimer 0.19 0.00 - 0.65 mg/L FEU   PROTHROMBIN TIME + INR    Collection Time: 02/21/23  8:29 AM   Result Value Ref Range    INR 1.0 0.9 - 1.1      Prothrombin time 10.3 9.0 - 11.1 sec   NT-PRO BNP    Collection Time: 02/21/23  8:29 AM   Result Value Ref Range    NT pro-BNP 1,402 (H) <125 PG/ML   TSH 3RD GENERATION    Collection Time: 02/21/23  8:29 AM   Result Value Ref Range    TSH 1.16 0.36 - 3.74 uIU/mL   EKG, 12 LEAD, SUBSEQUENT    Collection Time: 02/21/23  8:49 AM   Result Value Ref Range    Ventricular Rate 115 BPM    Atrial Rate 115 BPM    P-R Interval 170 ms    QRS Duration 102 ms    Q-T Interval 332 ms    QTC Calculation (Bezet) 459 ms    Calculated P Axis 12 degrees    Calculated R Axis -31 degrees    Calculated T Axis 10 degrees    Diagnosis       Sinus tachycardia  Left axis deviation  Minimal voltage criteria for LVH, may be normal variant ( [place] product )  Nonspecific ST abnormality  Abnormal ECG  When compared with ECG of 21-FEB-2023 08:16,  MANUAL COMPARISON REQUIRED, DATA IS UNCONFIRMED        XR CHEST PORT    Result Date: 2/21/2023  Clear lungs. Procedures - none unless documented below    EKG as interpreted by me: Sinus tachycardia at a rate of 134, ventricular ectopy present. Axis is normal, QRS is normal, no ST or T wave changes suggesting acute ischemia. ED course: Labs, EKG and imaging reviewed. Patient has a history of SVT, continues to be tachycardic to the 120's-140's despite receiving IV fluids, 12-leads confirm sinus tachycardia. Troponin unremarkable. TSH normal.  Low suspicion for PE given negative dimer. Discussed with CHELY Gandhi from cardiology who is trying to reach electrophysiology.     Will admit for continued management, discussed with the hospitalist.      Hospitalist Klaus Serve for Admission  10:53 AM    ED Room Number:   ER20/20  Patient Name and age:  Heidi Lyman 74 y.o.  female  Working Diagnosis:     1. Sinus tachycardia    2. Palpitations      COVID suspicion:   no  Code Status:    Full Code  Readmission:    no  Isolation Requirements:  no  Recommended Level of Care: telemetry  Department:    [facility] Adult ED - 21   Other:     Patient has a history of SVT, continues to be tachycardic to the 120's-140's despite receiving IV fluids, 12-leads confirm sinus tachycardia. Troponin unremarkable. TSH normal.  Low suspicion for PE given negative dimer. Discussed with CHELY Gandhi from cardiology who is trying to reach electrophysiology. Addendum [phone]:  Patient seen by Dr. Emmanuel Mccarty, after being admitted for several hours her heart rate normalized prior to her being transferred to the floor. She's now asymptomatic. Per Dr. Emmanuel Mccarty she can be safely discharged home to follow up in the office. He recommends continuing metoprolol 12.5 mg BID in the meantime which I prescribed. Will discharge home, strict return precautions communicated.

## 2023-02-21 NOTE — CONSULTS
n                                                               [facility]                       Cardiology Care Note     [x]Initial Consult     []Progress note    Patient Name: Mahesh Gautam - FNW:[id number] - ZCL:[id number]  Primary Cardiologist: none  Consulting Cardiologist: [employer] [facility]: Stephanie Shah MD     Reason for consult: SVT    HPI:       Mahesh Gautam is a 74 y.o. female with PMH significant for hypothyroidism, SVT status post cardiac ablation over 20 years ago, fibromyalgia, presenting with intermittent palpitations over the past week acutely worse yesterday evening into today. She feels like her heart is racing, denies associated chest pain, shortness of breath, dizziness or syncope. Cites significant emotional stress recently. No recent changes to her medication regimen, she missed a dose of her thyroid medicine yesterday but otherwise reports good compliance. She reports adequate po hydration, no alcohol or high consumption of caffeine. She was previously being followed by an Dr Rosmery Desir IV  in [place]. Denies associated fevers, cough, abdominal pain, vomiting, diarrhea, or any other systemic complaints. No recent stimulant use. No other systemic complaints. SUBJECTIVE:      Mahesh Gautam reports palpitations, elevated HR. Assessment and Plan     Tachycardia with hx of SVT ablation ~20 years ago. Currently sinus tachycardia. Will have her take her bb and CCB. Labs WNL  (TSH, cbc, trop, CMP) with the exception of elevated proBNP (1402) however she is not decompensated nor does she have a hx of heart failure.   Recommend follow up with Dr Rosmery Desir IV including echo and monitor.          ____________________________________________________________    Cardiac testing    Echo  8/14/2018  SUMMARY:  Left ventricle: Systolic function was normal. Ejection fraction was  estimated to be 56 % (HR: 94bpm) There was apical septal hypokinesia with  septal bounce and apical wall thinning and akinesia. Mitral valve: There was mild regurgitation. Tricuspid valve: There was mild regurgitation. Pulmonic valve: There was mild regurgitation. Most recent HS troponins:  Recent Labs     02/21/23  0829   TROPHS 7       ECG: sinus tachycardia    Review of Systems:    [x]All other systems reviewed and all negative except as written in HPI    [] Patient unable to provide secondary to condition    Past Medical History:   Diagnosis Date    Fibromyalgia     Hypothyroid     SVT (supraventricular tachycardia) (HCC)      Past Surgical History:   Procedure Laterality Date    HX OTHER SURGICAL      removal of IUD    HX SVT ABLATION      HX TONSILLECTOMY       Social Hx:  reports that she has never smoked. She has never used smokeless tobacco. She reports that she does not drink alcohol and does not use drugs. Family Hx: family history is not on file. Allergies   Allergen Reactions    Codeine Other (comments)     Hallucinations     Erythromycin Nausea Only and Other (comments)     Patient describes overall \"weird feeling\" when taking medication    Sulfa (Sulfonamide Antibiotics) Unknown (comments)          OBJECTIVE:  Wt Readings from Last 3 Encounters:   08/14/18 136 lb (61.7 kg)   06/26/18 136 lb 12.8 oz (62.1 kg)   05/02/18 134 lb 12.8 oz (61.1 kg)     No intake or output data in the 24 hours ending 02/21/23 0953    Physical Exam:    Vitals:   Vitals:    02/21/23 0822 02/21/23 0900   BP: (!) 172/85 (!) 179/97   Pulse: (!) 119 (!) 116   Resp: 16 16   Temp: 97.1 °F (36.2 °C)    SpO2: 99% 95%     Telemetry: sinus tach    Gen: Well-developed, well-nourished, in no acute distress  Neck: Supple, No JVD, No Carotid Bruit  Resp: No accessory muscle use, Clear breath sounds, No rales or rhonchi  Card: Regular Rate,Rythm, tachycardia,  Normal S1, S2, No murmurs, rubs or gallop. No thrills.    Abd:   Soft, non-tender, non-distended, BS+   MSK: No cyanosis  Skin: No rashes    Neuro: Moving all four extremities, follows commands appropriately  Psych: Good insight, oriented to person, place, alert, anxious. LE: No edema    Data Review:     Radiology:   XR Results (most recent):  Results from Hospital Encounter encounter on 02/21/23    XR CHEST PORT    Narrative  PORTABLE CHEST RADIOGRAPH/S: 2/21/2023 9:06 AM    INDICATION: Palpitations. COMPARISON: 4/20/2018. TECHNIQUE: Portable frontal upright radiograph/s of the chest.    FINDINGS:  The lungs are clear. The central airways are patent. No pneumothorax or pleural  effusion. Impression  Clear lungs. No results for input(s): NA, K, CL, CO2, BUN, CREA, GLU, PHOS, CA in the last 72 hours. Recent Labs     02/21/23  0829   WBC 6.0   HGB 15.0   HCT 46.1        Recent Labs     02/21/23  0829   PTP 10.3   INR 1.0     No results for input(s): CHOL, LDLC in the last 72 hours. No lab exists for component: TGL, HDLC,  HBA1C      Current meds:    Current Facility-Administered Medications:     sodium chloride 0.9 % bolus infusion 1,000 mL, 1,000 mL, IntraVENous, ONCE, Sophia Kendrick MD, Last Rate: 1,000 mL/hr at 02/21/23 0858, 1,000 mL at 02/21/23 1067    Current Outpatient Medications:     levothyroxine (SYNTHROID) 125 mcg tablet, Take  by mouth Daily (before breakfast). , Disp: , Rfl:     nebivolol (BYSTOLIC) 10 mg tablet, Take 1 Tab by mouth daily. , Disp: 90 Tab, Rfl: 3    levothyroxine (SYNTHROID) 137 mcg tablet, Take  by mouth Daily (before breakfast). , Disp: , Rfl:     Thyroid, Pork, (NATURE-THROID) 65 mg tab, Take 1 Tab by mouth daily. , Disp: , Rfl:     doxycycline (ADOXA) 100 mg tablet, Take 1 Tab by mouth two (2) times a day., Disp: , Rfl:     dilTIAZem CD (CARDIZEM CD) 120 mg ER capsule, Take 1 Cap by mouth daily. , Disp: 90 Cap, Rfl: 3    magnesium oxide (MAG-OX) 400 mg tablet, Take 1 Tab by mouth daily. , Disp: 30 Tab, Rfl: 0    levothyroxine (LEVOXYL) 112 mcg tablet, Take 112 mcg by mouth Daily (before breakfast). , Disp: , Rfl:     Vivien Traore, [id number] [facility] Cardiology  Call center: F) [phone]      CC:Marty Borrego MD

## 2023-02-21 NOTE — Clinical Note
Status[de-identified] INPATIENT [101]   Type of Bed: Telemetry [19]   Cardiac Monitoring Required?: No   Inpatient Hospitalization Certified Necessary for the Following Reasons: 9.  Other (further clarification in H&P documentation)   Admitting Diagnosis: SVT (supraventricular tachycardia) ([facility] [[id number]]   Admitting Physician: Chris Morgan [[id number]]   Attending Physician: Marcus Christian   Estimated Length of Stay: 2 Midnights   Discharge Plan[de-identified] Home with Office Follow-up

## 2023-02-21 NOTE — DISCHARGE INSTRUCTIONS
- Follow up with your cardiologist in [place] or with Dr. Arden Dexter  - Continue metoprolol twice a day and make sure to drink lots of fluids every day  - Stop the medication if you feel lightheaded or dizzy  - Return for chest pain, shortness of breath, dizziness, or any new or worsening symptoms

## 2023-02-22 NOTE — DISCHARGE SUMMARY
Discharge Summary       PATIENT ID: Ronald Pickett  MRN: [id number]   YOB: 1950    DATE OF ADMISSION: 2/21/2023  8:22 AM    DATE OF DISCHARGE: 2/2/23   PRIMARY CARE PROVIDER: Tanner Dance, MD     ATTENDING PHYSICIAN: Macarena Leahy  DISCHARGING PROVIDER: Taisha Mejia MD      CONSULTATIONS: IP CONSULT TO CARDIOLOGY    PROCEDURES/SURGERIES: * No surgery found *    ADMISSION SUMMARY AND HOSPITAL COURSE:   See admission note d/c'd from ED    DISCHARGE DIAGNOSES / PLAN:      Cleared by cardiology to d/c from ED    BMI: There is no height or weight on file to calculate BMI. . This patient: Has a BMI within normal limits. PENDING TEST RESULTS:   At the time of discharge the following test results are still pending:      ADDITIONAL CARE RECOMMENDATIONS:      DIET: Cardiac Diet    ACTIVITY: Activity as tolerated    EQUIPMENT needed:     NOTIFY YOUR PHYSICIAN FOR ANY OF THE FOLLOWING:   Fever over 101 degrees for 24 hours. Chest pain, shortness of breath, fever, chills, nausea, vomiting, diarrhea, change in mentation, falling, weakness, bleeding. Severe pain or pain not relieved by medications, as well as any other signs or symptoms that you may have questions about.     FOLLOW UP APPOINTMENTS:    Follow-up Information       Follow up With Specialties Details Why Contact Info    Anatoly Borrego MD Family Medicine Schedule an appointment as soon as possible for a visit   [place] [address]  [phone]      Afia Koo MD Cardiovascular Disease Physician, Interventional Cardiology Physician Schedule an appointment as soon as possible for a visit   [address] [place] [address] Dr Martinez [facility]  [phone]               DISCHARGE MEDICATIONS:  Discharge Medication List as of 2/21/2023  3:10 PM        START taking these medications    Details   metoprolol tartrate (LOPRESSOR) 25 mg tablet Take 0.5 Tablets by mouth two (2) times a day., Print, Disp-15 Tablet, R-0           CONTINUE these medications which have NOT CHANGED    Details   !! levothyroxine (SYNTHROID) 125 mcg tablet Take  by mouth Daily (before breakfast). , Historical Med      nebivolol (BYSTOLIC) 10 mg tablet Take 1 Tab by mouth daily. , Print, Disp-90 Tab, R-3      !! levothyroxine (SYNTHROID) 137 mcg tablet Take  by mouth Daily (before breakfast). , Historical Med      Thyroid, Pork, (NATURE-THROID) 65 mg tab Take 1 Tab by mouth daily. , Historical Med      doxycycline (ADOXA) 100 mg tablet Take 1 Tab by mouth two (2) times a day., Historical Med      dilTIAZem CD (CARDIZEM CD) 120 mg ER capsule Take 1 Cap by mouth daily. , Print, Disp-90 Cap, R-3      magnesium oxide (MAG-OX) 400 mg tablet Take 1 Tab by mouth daily. , Print, Disp-30 Tab, R-0      !! levothyroxine (LEVOXYL) 112 mcg tablet Take 112 mcg by mouth Daily (before breakfast). , Historical Med       !! - Potential duplicate medications found. Please discuss with provider. DISPOSITION:  x  Home With:   OT  PT  HH  RN       Long term SNF/Inpatient Rehab    Independent/assisted living    Hospice    Other:     PATIENT CONDITION AT DISCHARGE:     Functional status    Poor     Deconditioned    x Independent      Cognition    x Lucid     Forgetful     Dementia      Catheters/lines (plus indication)    Pineda     PICC     PEG    x None      Code status    x Full code     DNR      PHYSICAL EXAMINATION AT DISCHARGE:    General:          Alert, cooperative, no distress, appears stated age. HEENT:           Atraumatic, anicteric sclerae, pink conjunctivae                          No oral ulcers, mucosa moist, throat clear, dentition fair  Neck:               Supple, symmetrical  Lungs:             Clear to auscultation bilaterally. No Wheezing or Rhonchi. No rales. Chest wall:      No tenderness  No Accessory muscle use. Heart:              Regular  rhythm,  No  murmur   No edema  Abdomen:        Soft, non-tender.  Not distended. Bowel sounds normal  Extremities:     No cyanosis. No clubbing,                            Skin turgor normal, Capillary refill normal  Skin:                Not pale. Not Jaundiced  No rashes   Psych:             Not anxious or agitated.   Neurologic:      Alert, moves all extremities, answers questions appropriately and responds to commands       CHRONIC MEDICAL DIAGNOSES:  Problem List as of 2/21/2023 Date Reviewed: 5/2/2018            Codes Class Noted - Resolved    Card[facility] ([facility] ICD-10-CM: I42.9  ICD-9-CM: 425.4  6/26/2018 - Present        * (Principal) SVT (supraventricular tachycardia) (HCC) (Chronic) ICD-10-CM: I47.1  ICD-9-CM: 427.89  4/20/2018 - Present           Greater than 10 minutes were spent with the patient on counseling and coordination of care    Signed:   Shweta Chin MD  2/22/2023  8:36 AM

## 2023-05-20 RX ORDER — NEBIVOLOL 10 MG/1
10 TABLET ORAL DAILY
COMMUNITY
Start: 2018-08-14

## 2023-05-20 RX ORDER — DILTIAZEM HYDROCHLORIDE 120 MG/1
120 CAPSULE, EXTENDED RELEASE ORAL DAILY
COMMUNITY
Start: 2018-04-21

## 2023-05-20 RX ORDER — THYROID, PORCINE 60 MG/1
1 TABLET ORAL DAILY
COMMUNITY

## 2023-05-20 RX ORDER — MAGNESIUM OXIDE 400 MG/1
400 TABLET ORAL DAILY
COMMUNITY
Start: 2018-04-22

## 2023-05-20 RX ORDER — LEVOTHYROXINE SODIUM 137 UG/1
TABLET ORAL
COMMUNITY

## 2023-05-20 RX ORDER — DOXYCYCLINE 100 MG/1
1 TABLET ORAL 2 TIMES DAILY
COMMUNITY
Start: 2018-04-25

## 2023-05-20 RX ORDER — LEVOTHYROXINE SODIUM 112 UG/1
112 TABLET ORAL
COMMUNITY

## 2023-05-20 RX ORDER — LEVOTHYROXINE SODIUM 0.12 MG/1
TABLET ORAL
COMMUNITY